# Patient Record
Sex: MALE
[De-identification: names, ages, dates, MRNs, and addresses within clinical notes are randomized per-mention and may not be internally consistent; named-entity substitution may affect disease eponyms.]

---

## 2017-04-10 ENCOUNTER — RX RENEWAL (OUTPATIENT)
Age: 57
End: 2017-04-10

## 2017-04-10 RX ORDER — POTASSIUM CHLORIDE 1500 MG/1
20 TABLET, FILM COATED, EXTENDED RELEASE ORAL DAILY
Qty: 4 | Refills: 0 | Status: ACTIVE | COMMUNITY
Start: 2017-04-10 | End: 1900-01-01

## 2017-05-01 ENCOUNTER — APPOINTMENT (OUTPATIENT)
Dept: SURGICAL ONCOLOGY | Facility: CLINIC | Age: 57
End: 2017-05-01

## 2017-12-17 ENCOUNTER — TRANSCRIPTION ENCOUNTER (OUTPATIENT)
Age: 57
End: 2017-12-17

## 2018-06-26 ENCOUNTER — RX RENEWAL (OUTPATIENT)
Age: 58
End: 2018-06-26

## 2018-06-26 RX ORDER — POLYETHYLENE GLYCOL 3350, SODIUM SULFATE, SODIUM CHLORIDE, POTASSIUM CHLORIDE, ASCORBIC ACID, SODIUM ASCORBATE 7.5-2.691G
100 KIT ORAL
Qty: 1 | Refills: 0 | Status: ACTIVE | COMMUNITY
Start: 2018-06-26 | End: 1900-01-01

## 2018-06-26 RX ORDER — POTASSIUM CHLORIDE 1500 MG/1
20 TABLET, EXTENDED RELEASE ORAL AS DIRECTED
Qty: 4 | Refills: 0 | Status: ACTIVE | COMMUNITY
Start: 2018-06-26 | End: 1900-01-01

## 2018-07-16 ENCOUNTER — OUTPATIENT (OUTPATIENT)
Dept: OUTPATIENT SERVICES | Facility: HOSPITAL | Age: 58
LOS: 1 days | End: 2018-07-16
Payer: COMMERCIAL

## 2018-07-16 ENCOUNTER — APPOINTMENT (OUTPATIENT)
Dept: SURGICAL ONCOLOGY | Facility: HOSPITAL | Age: 58
End: 2018-07-16

## 2018-07-16 ENCOUNTER — RESULT REVIEW (OUTPATIENT)
Age: 58
End: 2018-07-16

## 2018-07-16 DIAGNOSIS — Z12.11 ENCOUNTER FOR SCREENING FOR MALIGNANT NEOPLASM OF COLON: ICD-10-CM

## 2018-07-16 PROCEDURE — 45384 COLONOSCOPY W/LESION REMOVAL: CPT

## 2018-07-16 PROCEDURE — 88305 TISSUE EXAM BY PATHOLOGIST: CPT | Mod: 26

## 2018-07-16 PROCEDURE — 88305 TISSUE EXAM BY PATHOLOGIST: CPT

## 2018-07-18 LAB — SURGICAL PATHOLOGY STUDY: SIGNIFICANT CHANGE UP

## 2018-10-05 ENCOUNTER — EMERGENCY (EMERGENCY)
Facility: HOSPITAL | Age: 58
LOS: 1 days | Discharge: LEFT BEFORE DISCHARGE | End: 2018-10-05
Attending: EMERGENCY MEDICINE
Payer: COMMERCIAL

## 2018-10-05 VITALS
TEMPERATURE: 98 F | SYSTOLIC BLOOD PRESSURE: 117 MMHG | DIASTOLIC BLOOD PRESSURE: 75 MMHG | HEART RATE: 74 BPM | HEIGHT: 73 IN | RESPIRATION RATE: 16 BRPM | WEIGHT: 182.98 LBS | OXYGEN SATURATION: 99 %

## 2018-10-05 PROCEDURE — 71045 X-RAY EXAM CHEST 1 VIEW: CPT

## 2018-10-05 PROCEDURE — 71045 X-RAY EXAM CHEST 1 VIEW: CPT | Mod: 26

## 2018-10-05 PROCEDURE — 93005 ELECTROCARDIOGRAM TRACING: CPT

## 2018-10-05 PROCEDURE — 99283 EMERGENCY DEPT VISIT LOW MDM: CPT | Mod: 25

## 2018-10-05 PROCEDURE — 99283 EMERGENCY DEPT VISIT LOW MDM: CPT

## 2018-10-05 NOTE — ED PROVIDER NOTE - MEDICAL DECISION MAKING DETAILS
56yo M h/o cad cabg HLD, s/p angio/stenting today at [facility] p/w L sided chest pain. denies f/c/n/v/d/diaphoresis. pt reports pain is nonexertional, described as poking sensation to L side. pt reports abnormal stress testing w/ cardio, had angio today, stented, reports pain same site during procedure. received brillinta loading dose prior to discharge.

## 2018-10-05 NOTE — ED ADULT NURSE NOTE - CHIEF COMPLAINT QUOTE
Pt states" I had a cardiac stent placed this am, now I'm feeling like something is poking me in the chest" Pt states" I had a cardiac stent placed this am at [facility],, now I'm feeling like something is poking me in the chest"

## 2018-10-05 NOTE — ED PROVIDER NOTE - PROGRESS NOTE DETAILS
ekg ns. nonischemic, labs pending, patient leaving against medical advice.   The patient has decided to leave against medical advice (AMA).  I have made reasonable attempts to explain to the patient that leaving prior to completion of work up and treatment may result in recurrent or worsening of symptoms, severe permanent disability, pain and suffering, harm, injury, and/or death.  I have explained the risks, benefits, and alternatives to treatment as well as the attendant risks of refusing treatment at this time.  The patient has demonstrated comprehension and verbalizes understanding of these risks.  The patient has been told that they must return to the ER immediately for persistent or recurring symptoms, worsening symptoms, or any concerning symptoms.  The patient has also been informed that they may return to the ER immediately at any time if they change their mind and wish to resume care. The patient has been given the opportunity to ask questions and have them fully answered.

## 2018-10-05 NOTE — ED PROVIDER NOTE - NS ED ROS FT
GEN: no fever, no chills, no weakness  HENT: no eye pain, no visual changes, no ear pain, no visual or hearing changes, no sore throat, no swelling or neck pain  CV: +chest pain, no palpitations, no dizziness, no swelling  RESP: no coughing, no sob, no IWOB, no ARAUJO  GI: no abd pain, no distension, no nausea, no vomiting, no diarrhea, no constipation  : no dysuria,  no frequency, no hematuria, no discharge, no flank pain  MUSCULOSKELETAL: no myalgia, no arthralgia, no joint swelling, no bruising   SKIN: no rash, no wounds, no itching  NEURO: no change in mentation, no visual changes, no HA, no focal weakness, no trouble speaking, no gait abnormalities, no dizziness  PSYCH: no suidical ideation, no homicidal ideation, no depression, no anxiety, no hallucinations

## 2018-10-05 NOTE — ED ADULT NURSE NOTE - OBJECTIVE STATEMENT
Pt developed Pt developed mild chest pain to chest, generalized and abdomen. s/p cardiac stent placement. Pt refused care and stated "I had enough for one day,  I didn't follow post care instructions and now I have chest pain". Pt advised to atleast have blood work done. Pt refused. MD made aware and also spoke with patient. Pt signed AMA form.

## 2018-10-05 NOTE — ED ADULT TRIAGE NOTE - CHIEF COMPLAINT QUOTE
Pt states" I had a cardiac stent placed this am, now I'm feeling like something is poking me in the chest"

## 2018-10-05 NOTE — ED PROVIDER NOTE - PHYSICAL EXAMINATION
GEN: awake, alert, well appearing, NAD   HENT: atraumatic, normocephalic, BRADEN, EOMI, no midline instability, oropharynx w/o erythema or exudates, no lymphadenopathy  CV: normal rate and rhythm, S1, S2, no MRG, equal pulses throughout, no JVD  RESP: no distress, no IWOB, no retraction, clear to auscultation bilaterally   ABD: soft, nontender, nondistended, no rebound, no guarding, normoactive bowel sounds, no organomegally  MUSCULOSKELETAL: strenght 5/5 x 4, full range of motion, CMS intact   SKIN: normal color, no turgor, no wounds or rash   NEURO: Awake alert oriented x 3, no facial asymmetry, no slurred speech, no pronator drift, moving all extremities  PSYCH: no suicial ideation, no homicidal ideation, no depression, no anxiety, no hallucination

## 2018-10-05 NOTE — ED PROVIDER NOTE - CARE PLAN
Principal Discharge DX:	Chest pain, unspecified type  Secondary Diagnosis:	Left against medical advice

## 2020-01-25 ENCOUNTER — EMERGENCY (EMERGENCY)
Facility: HOSPITAL | Age: 60
LOS: 1 days | Discharge: HOME | End: 2020-01-25
Attending: EMERGENCY MEDICINE | Admitting: EMERGENCY MEDICINE
Payer: COMMERCIAL

## 2020-01-25 VITALS
TEMPERATURE: 98 F | RESPIRATION RATE: 16 BRPM | WEIGHT: 179.9 LBS | DIASTOLIC BLOOD PRESSURE: 72 MMHG | HEART RATE: 55 BPM | SYSTOLIC BLOOD PRESSURE: 151 MMHG | OXYGEN SATURATION: 98 % | HEIGHT: 73 IN

## 2020-01-25 VITALS
HEART RATE: 52 BPM | DIASTOLIC BLOOD PRESSURE: 75 MMHG | RESPIRATION RATE: 16 BRPM | OXYGEN SATURATION: 98 % | TEMPERATURE: 98 F | SYSTOLIC BLOOD PRESSURE: 123 MMHG

## 2020-01-25 LAB
ALBUMIN SERPL ELPH-MCNC: 3.5 G/DL — SIGNIFICANT CHANGE UP (ref 3.3–5)
ALP SERPL-CCNC: 59 U/L — SIGNIFICANT CHANGE UP (ref 40–120)
ALT FLD-CCNC: 25 U/L — SIGNIFICANT CHANGE UP (ref 12–78)
ANION GAP SERPL CALC-SCNC: 7 MMOL/L — SIGNIFICANT CHANGE UP (ref 5–17)
AST SERPL-CCNC: 15 U/L — SIGNIFICANT CHANGE UP (ref 15–37)
BASOPHILS # BLD AUTO: 0.07 K/UL — SIGNIFICANT CHANGE UP (ref 0–0.2)
BASOPHILS NFR BLD AUTO: 0.7 % — SIGNIFICANT CHANGE UP (ref 0–2)
BILIRUB SERPL-MCNC: 0.3 MG/DL — SIGNIFICANT CHANGE UP (ref 0.2–1.2)
BUN SERPL-MCNC: 13 MG/DL — SIGNIFICANT CHANGE UP (ref 7–23)
CALCIUM SERPL-MCNC: 8.8 MG/DL — SIGNIFICANT CHANGE UP (ref 8.5–10.1)
CHLORIDE SERPL-SCNC: 107 MMOL/L — SIGNIFICANT CHANGE UP (ref 96–108)
CO2 SERPL-SCNC: 27 MMOL/L — SIGNIFICANT CHANGE UP (ref 22–31)
CREAT SERPL-MCNC: 0.69 MG/DL — SIGNIFICANT CHANGE UP (ref 0.5–1.3)
EOSINOPHIL # BLD AUTO: 0.1 K/UL — SIGNIFICANT CHANGE UP (ref 0–0.5)
EOSINOPHIL NFR BLD AUTO: 1 % — SIGNIFICANT CHANGE UP (ref 0–6)
GLUCOSE SERPL-MCNC: 102 MG/DL — HIGH (ref 70–99)
HCT VFR BLD CALC: 44.3 % — SIGNIFICANT CHANGE UP (ref 39–50)
HGB BLD-MCNC: 15 G/DL — SIGNIFICANT CHANGE UP (ref 13–17)
IMM GRANULOCYTES NFR BLD AUTO: 0.2 % — SIGNIFICANT CHANGE UP (ref 0–1.5)
LYMPHOCYTES # BLD AUTO: 1.38 K/UL — SIGNIFICANT CHANGE UP (ref 1–3.3)
LYMPHOCYTES # BLD AUTO: 14.3 % — SIGNIFICANT CHANGE UP (ref 13–44)
MCHC RBC-ENTMCNC: 30.7 PG — SIGNIFICANT CHANGE UP (ref 27–34)
MCHC RBC-ENTMCNC: 33.9 GM/DL — SIGNIFICANT CHANGE UP (ref 32–36)
MCV RBC AUTO: 90.6 FL — SIGNIFICANT CHANGE UP (ref 80–100)
MONOCYTES # BLD AUTO: 0.61 K/UL — SIGNIFICANT CHANGE UP (ref 0–0.9)
MONOCYTES NFR BLD AUTO: 6.3 % — SIGNIFICANT CHANGE UP (ref 2–14)
NEUTROPHILS # BLD AUTO: 7.46 K/UL — HIGH (ref 1.8–7.4)
NEUTROPHILS NFR BLD AUTO: 77.5 % — HIGH (ref 43–77)
NRBC # BLD: 0 /100 WBCS — SIGNIFICANT CHANGE UP (ref 0–0)
PLATELET # BLD AUTO: 220 K/UL — SIGNIFICANT CHANGE UP (ref 150–400)
POTASSIUM SERPL-MCNC: 3.9 MMOL/L — SIGNIFICANT CHANGE UP (ref 3.5–5.3)
POTASSIUM SERPL-SCNC: 3.9 MMOL/L — SIGNIFICANT CHANGE UP (ref 3.5–5.3)
PROT SERPL-MCNC: 7.2 G/DL — SIGNIFICANT CHANGE UP (ref 6–8.3)
RBC # BLD: 4.89 M/UL — SIGNIFICANT CHANGE UP (ref 4.2–5.8)
RBC # FLD: 12.1 % — SIGNIFICANT CHANGE UP (ref 10.3–14.5)
SODIUM SERPL-SCNC: 141 MMOL/L — SIGNIFICANT CHANGE UP (ref 135–145)
WBC # BLD: 9.64 K/UL — SIGNIFICANT CHANGE UP (ref 3.8–10.5)
WBC # FLD AUTO: 9.64 K/UL — SIGNIFICANT CHANGE UP (ref 3.8–10.5)

## 2020-01-25 PROCEDURE — 36415 COLL VENOUS BLD VENIPUNCTURE: CPT

## 2020-01-25 PROCEDURE — 85027 COMPLETE CBC AUTOMATED: CPT

## 2020-01-25 PROCEDURE — 96374 THER/PROPH/DIAG INJ IV PUSH: CPT

## 2020-01-25 PROCEDURE — 93010 ELECTROCARDIOGRAM REPORT: CPT

## 2020-01-25 PROCEDURE — 96361 HYDRATE IV INFUSION ADD-ON: CPT

## 2020-01-25 PROCEDURE — 70450 CT HEAD/BRAIN W/O DYE: CPT

## 2020-01-25 PROCEDURE — 99284 EMERGENCY DEPT VISIT MOD MDM: CPT

## 2020-01-25 PROCEDURE — 80053 COMPREHEN METABOLIC PANEL: CPT

## 2020-01-25 PROCEDURE — 70450 CT HEAD/BRAIN W/O DYE: CPT | Mod: 26

## 2020-01-25 PROCEDURE — 93005 ELECTROCARDIOGRAM TRACING: CPT

## 2020-01-25 PROCEDURE — 99284 EMERGENCY DEPT VISIT MOD MDM: CPT | Mod: 25

## 2020-01-25 RX ORDER — SODIUM CHLORIDE 9 MG/ML
1000 INJECTION INTRAMUSCULAR; INTRAVENOUS; SUBCUTANEOUS ONCE
Refills: 0 | Status: COMPLETED | OUTPATIENT
Start: 2020-01-25 | End: 2020-01-25

## 2020-01-25 RX ORDER — MECLIZINE HCL 12.5 MG
1 TABLET ORAL
Qty: 20 | Refills: 0
Start: 2020-01-25 | End: 2020-01-29

## 2020-01-25 RX ORDER — MECLIZINE HCL 12.5 MG
25 TABLET ORAL ONCE
Refills: 0 | Status: COMPLETED | OUTPATIENT
Start: 2020-01-25 | End: 2020-01-25

## 2020-01-25 RX ORDER — ONDANSETRON 8 MG/1
4 TABLET, FILM COATED ORAL ONCE
Refills: 0 | Status: COMPLETED | OUTPATIENT
Start: 2020-01-25 | End: 2020-01-25

## 2020-01-25 RX ADMIN — SODIUM CHLORIDE 1000 MILLILITER(S): 9 INJECTION INTRAMUSCULAR; INTRAVENOUS; SUBCUTANEOUS at 13:34

## 2020-01-25 RX ADMIN — Medication 25 MILLIGRAM(S): at 13:34

## 2020-01-25 RX ADMIN — SODIUM CHLORIDE 1000 MILLILITER(S): 9 INJECTION INTRAMUSCULAR; INTRAVENOUS; SUBCUTANEOUS at 14:30

## 2020-01-25 RX ADMIN — ONDANSETRON 4 MILLIGRAM(S): 8 TABLET, FILM COATED ORAL at 13:34

## 2020-01-25 NOTE — ED PROVIDER NOTE - NSFOLLOWUPINSTRUCTIONS_ED_ALL_ED_FT
1.  Take antivert as prescribed.    Dizziness  Dizziness is a common problem. It is a feeling of unsteadiness or light-headedness. You may feel like you are about to faint. Dizziness can lead to injury if you stumble or fall. Anyone can become dizzy, but dizziness is more common in older adults. This condition can be caused by a number of things, including medicines, dehydration, or illness.  Follow these instructions at home:  Eating and drinking     Drink enough fluid to keep your urine clear or pale yellow. This helps to keep you from becoming dehydrated. Try to drink more clear fluids, such as water.Do not drink alcohol.Limit your caffeine intake if told to do so by your health care provider. Check ingredients and nutrition facts to see if a food or beverage contains caffeine.Limit your salt (sodium) intake if told to do so by your health care provider. Check ingredients and nutrition facts to see if a food or beverage contains sodium.Activity     Avoid making quick movements.  Rise slowly from chairs and steady yourself until you feel okay.In the morning, first sit up on the side of the bed. When you feel okay, stand slowly while you hold onto something until you know that your balance is fine.If you need to  one place for a long time, move your legs often. Tighten and relax the muscles in your legs while you are standing.Do not drive or use heavy machinery if you feel dizzy.Avoid bending down if you feel dizzy. Place items in your home so that they are easy for you to reach without leaning over.Lifestyle     Do not use any products that contain nicotine or tobacco, such as cigarettes and e-cigarettes. If you need help quitting, ask your health care provider.Try to reduce your stress level by using methods such as yoga or meditation. Talk with your health care provider if you need help to manage your stress.General instructions     Watch your dizziness for any changes.Take over-the-counter and prescription medicines only as told by your health care provider. Talk with your health care provider if you think that your dizziness is caused by a medicine that you are taking.Tell a friend or a family member that you are feeling dizzy. If he or she notices any changes in your behavior, have this person call your health care provider.Keep all follow-up visits as told by your health care provider. This is important.Contact a health care provider if:  Your dizziness does not go away.Your dizziness or light-headedness gets worse.You feel nauseous.You have reduced hearing.You have new symptoms.You are unsteady on your feet or you feel like the room is spinning.Get help right away if:  You vomit or have diarrhea and are unable to eat or drink anything.You have problems talking, walking, swallowing, or using your arms, hands, or legs.You feel generally weak.You are not thinking clearly or you have trouble forming sentences. It may take a friend or family member to notice this.You have chest pain, abdominal pain, shortness of breath, or sweating.Your vision changes.You have any bleeding.You have a severe headache.You have neck pain or a stiff neck.You have a fever.These symptoms may represent a serious problem that is an emergency. Do not wait to see if the symptoms will go away. Get medical help right away. Call your local emergency services (911 in the [place] Do not drive yourself to the hospital.   Summary  Dizziness is a feeling of unsteadiness or light-headedness. This condition can be caused by a number of things, including medicines, dehydration, or illness.Anyone can become dizzy, but dizziness is more common in older adults.Drink enough fluid to keep your urine clear or pale yellow. Do not drink alcohol.Avoid making quick movements if you feel dizzy. Monitor your dizziness for any changes.This information is not intended to replace advice given to you by your health care provider. Make sure you discuss any questions you have with your health care provider.    Document Released: 06/13/2002 Document Revised: 01/20/2018 Document Reviewed: 01/20/2018  [employer] Interactive Patient Education © 2019 [employer]

## 2020-01-25 NOTE — ED ADULT NURSE NOTE - CHPI ED NUR SYMPTOMS NEG
no fever/no vomiting/no change in level of consciousness/no blurred vision/no confusion/no loss of consciousness

## 2020-01-25 NOTE — ED PROVIDER NOTE - CARE PROVIDER_API CALL
Katya Farah)  Neurology  [address] [place] [address]  Phone: [phone]  Fax: [phone]  Follow Up Time:     Neno Nicole)  Otolaryngology  [address] [place] [address]  Phone: [phone]  Fax: [phone]  Follow Up Time:

## 2020-01-25 NOTE — ED PROVIDER NOTE - OBJECTIVE STATEMENT
Mild dizzy upon waking up, got worse during breakfast 8:30, got nauseous, but no vominting. Similar episodes in the past but was mild and resovled spontaneously, last was years ago.  no other complaints.

## 2020-01-25 NOTE — ED PROVIDER NOTE - DISCHARGE DATE
patient was critically ill... Patient was critically ill with a high probability of imminent or life threatening deterioration. 25-Jan-2020

## 2020-01-25 NOTE — ED PROVIDER NOTE - PROGRESS NOTE DETAILS
All results were explained to patient and/or family and a copy of all available results given.  wife was present

## 2020-01-25 NOTE — ED PROVIDER NOTE - PATIENT PORTAL LINK FT
You can access the FollowMyHealth Patient Portal offered by [facility] by registering at the following website: [email or web address]. By joining [employer]’s FollowMyHealth portal, you will also be able to view your health information using other applications (apps) compatible with our system.

## 2020-12-27 ENCOUNTER — EMERGENCY (EMERGENCY)
Facility: HOSPITAL | Age: 60
LOS: 1 days | Discharge: LEFT BEFORE DISCHARGE | End: 2020-12-27
Attending: EMERGENCY MEDICINE | Admitting: EMERGENCY MEDICINE
Payer: COMMERCIAL

## 2020-12-27 VITALS
SYSTOLIC BLOOD PRESSURE: 158 MMHG | DIASTOLIC BLOOD PRESSURE: 88 MMHG | RESPIRATION RATE: 15 BRPM | WEIGHT: 179.9 LBS | OXYGEN SATURATION: 99 % | HEART RATE: 71 BPM | TEMPERATURE: 97 F | HEIGHT: 73 IN

## 2020-12-27 PROCEDURE — L9991: CPT

## 2020-12-27 PROCEDURE — 99281 EMR DPT VST MAYX REQ PHY/QHP: CPT

## 2020-12-27 RX ORDER — ASPIRIN/CALCIUM CARB/MAGNESIUM 324 MG
1 TABLET ORAL
Qty: 0 | Refills: 0 | DISCHARGE

## 2020-12-27 RX ORDER — ROSUVASTATIN CALCIUM 5 MG/1
1 TABLET ORAL
Qty: 0 | Refills: 0 | DISCHARGE

## 2021-01-28 ENCOUNTER — APPOINTMENT (OUTPATIENT)
Dept: ORTHOPEDIC SURGERY | Facility: CLINIC | Age: 61
End: 2021-01-28
Payer: COMMERCIAL

## 2021-01-28 VITALS — HEIGHT: 73 IN | BODY MASS INDEX: 23.86 KG/M2 | WEIGHT: 180 LBS

## 2021-01-28 DIAGNOSIS — M75.42 IMPINGEMENT SYNDROME OF LEFT SHOULDER: ICD-10-CM

## 2021-01-28 DIAGNOSIS — M75.52 BURSITIS OF LEFT SHOULDER: ICD-10-CM

## 2021-01-28 PROCEDURE — 73030 X-RAY EXAM OF SHOULDER: CPT | Mod: LT

## 2021-01-28 PROCEDURE — 99072 ADDL SUPL MATRL&STAF TM PHE: CPT

## 2021-01-28 PROCEDURE — 99203 OFFICE O/P NEW LOW 30 MIN: CPT

## 2021-01-28 NOTE — PHYSICAL EXAM
[de-identified] : Right Upper Extremity\par o Shoulder :\par ¦ Inspection/Palpation : no tenderness over the greater tuberosity, no acromioclavicular joint tenderness, no tenderness anterior and posterior glenohumeral joint,no swelling, no deformities\par ¦ Range of Motion : ACTIVE FORWARD ELEVATION: Measured at 140 degrees, ACTIVE EXTERNAL ROTATION: Measured at 60 degrees, ACTIVE INTERNAL ROTATION: Measured at T7 \par ¦ Strength : external rotation 5/5, internal rotation 5/5, supraspinatus 5/5\par ¦ Stability : no joint instability on provocative testing\par ¦ Tests/Signs : Neer (-), Alcantara (-)\par o Upper Arm : no tenderness, no swelling, no deformities\par o Muscle Bulk : no atrophy\par o Sensation : sensation intact to light touch\par o Skin : no skin rash or discoloration\par o Vascular Exam : no edema, no cyanosis, radial and ulnar pulses normal\par \par Left Upper Extremity\par o Shoulder :\par ¦ Inspection/Palpation :  tenderness over the greater tuberosity, no acromioclavicular joint tenderness, no tenderness anterior and posterior glenohumeral joint,no swelling, no deformities\par ¦ Range of Motion : ACTIVE FORWARD ELEVATION: Measured at 135 degrees with pain, ACTIVE EXTERNAL ROTATION: Measured at 60 degrees with pain, ACTIVE INTERNAL ROTATION: Measured at T9 with pain. \par ¦ Strength : external rotation 5/5, internal rotation 5/5, supraspinatus 5/5\par ¦ Stability : no joint instability on provocative testing\par ¦ Tests/Signs : Neer (+), Alcantara (+) Speed’s Test (-) \par o Upper Arm : no tenderness, no swelling, no deformities\par o Muscle Bulk : no atrophy\par o Sensation : sensation intact to light touch\par o Skin : no skin rash or discoloration\par o Vascular Exam : no edema, no cyanosis, radial and ulnar pulses normal\par  [de-identified] : o Left Shoulder : Grashey, Axillary and Outlet views were obtained, there are no soft tissue abnormalities, no fractures, alignment is normal, normal appearing joint spaces, normal bone density, no bony lesions.\par \par

## 2021-01-28 NOTE — DISCUSSION/SUMMARY
[de-identified] : The underlying pathophysiology was reviewed in great detail with the patient as well as the various treatment options, including ice, analgesics, NSAIDs, Physical therapy, steroid injections.\par \par A home exercise sheet was given and discussed with the patient to follow.A Thera-Band was provided for exercise program. \par \par A prescription for Physical Therapy was provided.\par \par Activity modifications and restrictions were discussed. I advised avoiding overhead lifting. I advised the patient to work on good posture. \par \par FU 6 weeks. \par \par All questions were answered, all alternatives discussed and the patient is in complete agreement with that plan. Follow-up appointment as instructed. Any issues and the patient will call or come in sooner.

## 2023-02-21 ENCOUNTER — NON-APPOINTMENT (OUTPATIENT)
Age: 63
End: 2023-02-21

## 2023-03-01 PROBLEM — C44.519 BASAL CELL CARCINOMA (BCC) OF BACK: Status: ACTIVE | Noted: 2023-03-01

## 2023-03-02 ENCOUNTER — NON-APPOINTMENT (OUTPATIENT)
Age: 63
End: 2023-03-02

## 2023-03-02 ENCOUNTER — APPOINTMENT (OUTPATIENT)
Dept: SURGICAL ONCOLOGY | Facility: CLINIC | Age: 63
End: 2023-03-02
Payer: COMMERCIAL

## 2023-03-02 VITALS
TEMPERATURE: 97.7 F | HEART RATE: 71 BPM | BODY MASS INDEX: 24.52 KG/M2 | HEIGHT: 72 IN | WEIGHT: 181 LBS | RESPIRATION RATE: 17 BRPM | SYSTOLIC BLOOD PRESSURE: 139 MMHG | OXYGEN SATURATION: 98 % | DIASTOLIC BLOOD PRESSURE: 82 MMHG

## 2023-03-02 DIAGNOSIS — C44.519 BASAL CELL CARCINOMA OF SKIN OF OTHER PART OF TRUNK: ICD-10-CM

## 2023-03-02 PROCEDURE — 99204 OFFICE O/P NEW MOD 45 MIN: CPT

## 2023-03-03 NOTE — REVIEW OF SYSTEMS
[Negative] : Heme/Lymph [FreeTextEntry5] : CAD [de-identified] : Skin cancer [de-identified] : Ocular migraines

## 2023-03-03 NOTE — REASON FOR VISIT
[Initial Consultation] : an initial consultation for [Other: _____] : [unfilled] [FreeTextEntry2] : Basal cell carcinoma of the lower mid back

## 2023-03-03 NOTE — ASSESSMENT
[FreeTextEntry1] : 62-year-old man.\par \par History of CAD and CABG.\par \par Newly diagnosed basal cell carcinoma of the lower mid back.\par \par We reviewed his diagnosis, and the therapeutic options, of which one is surgical excision, as an outpatient surgical procedure, by me, coordinated with plastics.\par This is his preference over other  modalities such as electrical dissection and curettage, a Mohs procedure, or resection by plastics\par \par Oncologic concerns, operative approach, risk, benefits, alternatives, possible surgical outcomes reviewed in detail, all questions answered.\par \par \par Paperwork for surgical scheduling submitted.\par I contacted Dr. Tera Blackmon to let him know the plan.\par \par \par 3/3/2023:\par Patient called to inform us that he will be having his basal cell cancer removed as an office procedure by plastic surgery.\par No further details were provided

## 2023-03-03 NOTE — PHYSICAL EXAM
[Normal] : supple, no neck mass and thyroid not enlarged [Normal Neck Lymph Nodes] : normal neck lymph nodes  [Normal Supraclavicular Lymph Nodes] : normal supraclavicular lymph nodes [Normal Axillary Lymph Nodes] : normal axillary lymph nodes [Normal] : full range of motion and no deformities appreciated [de-identified] : Groins not examined [de-identified] : Below

## 2023-03-03 NOTE — HISTORY OF PRESENT ILLNESS
[de-identified] : 62-year-old man referred by a mutual friend, Dr. Tera LONGORIA with a basal cell carcinoma (BCCA) of his LOWER MID-BACK.\par \par This was an asymptomatic cutaneous lesion of unclear duration identified on baseline dermatologic survey with Dr. Ragsdale.\par The previous skin exam was with a different dermatologist, ~2020.\par \par No previous personal history of skin cancer.\par \par No prior personal history of malignancy.\par \par \par No relatives with skin cancer.\par \par + Family history of malignancy:\par Mother: Breast cancer.\par Father: Colorectal cancer.\par Our patient's colonoscopy is recorded below.\par \par \par Derm: Dr. Simon RAGSDALE\par \par \par No PCP presently.\par \par Cardiology: Dr. Servando MORA.\par \par No pacemaker or defibrillator.\par No anticoagulants.\par \par 81 mg aspirin daily.\par \par History of CAD.\par + Previous CABG (~2009)\par \par + Hypercholesterolemia.\par He takes Crestor and Zetia.\par \par \par + History of ocular migraines.\par His ophthalmologist is Dr. Ari ROBLES.\par Fall 2022 visit was unremarkable.\par \par \par September 2021:\par Colonoscopy with Dr. Cat TORRES at [facility]: Okay x3 years.

## 2023-03-22 ENCOUNTER — OUTPATIENT (OUTPATIENT)
Dept: OUTPATIENT SERVICES | Facility: HOSPITAL | Age: 63
LOS: 1 days | End: 2023-03-22
Payer: COMMERCIAL

## 2023-03-22 DIAGNOSIS — C80.1 MALIGNANT (PRIMARY) NEOPLASM, UNSPECIFIED: ICD-10-CM

## 2023-03-23 ENCOUNTER — RESULT REVIEW (OUTPATIENT)
Age: 63
End: 2023-03-23

## 2023-03-23 PROCEDURE — 88321 CONSLTJ&REPRT SLD PREP ELSWR: CPT

## 2023-03-28 LAB — SURGICAL PATHOLOGY STUDY: SIGNIFICANT CHANGE UP

## 2023-07-20 ENCOUNTER — APPOINTMENT (OUTPATIENT)
Dept: ORTHOPEDIC SURGERY | Facility: CLINIC | Age: 63
End: 2023-07-20
Payer: COMMERCIAL

## 2023-07-20 VITALS — BODY MASS INDEX: 24.52 KG/M2 | WEIGHT: 181 LBS | HEIGHT: 72 IN

## 2023-07-20 PROCEDURE — 99204 OFFICE O/P NEW MOD 45 MIN: CPT

## 2023-07-20 PROCEDURE — 73030 X-RAY EXAM OF SHOULDER: CPT | Mod: LT

## 2023-07-20 PROCEDURE — 73010 X-RAY EXAM OF SHOULDER BLADE: CPT | Mod: LT

## 2023-07-20 RX ORDER — NAPROXEN 500 MG/1
500 TABLET ORAL TWICE DAILY
Qty: 30 | Refills: 1 | Status: ACTIVE | COMMUNITY
Start: 2023-07-20 | End: 1900-01-01

## 2023-07-20 NOTE — HISTORY OF PRESENT ILLNESS
[Gradual] : gradual [7] : 7 [6] : 6 [Dull/Aching] : dull/aching [Sharp] : sharp [Leisure] : leisure [Ice] : ice [de-identified] : 07/20/2023 Mr. HAILEY WARREN, a 62 year old male, presents today for left shoulder pain, intermittent over the past few years. Recent worsening. Has tried home exercises and activity modifications. Worse with reaching outward and twisting motions. Pain has become bothersome at night. Denies n/t. Denies neck pain, but has noticed correlation to neck position and shoulder pain. 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 No prior injury. Has had this pain for a couple years. ROM normal. States pain radiates up to the neck.  [FreeTextEntry7] : Neck

## 2023-07-20 NOTE — DISCUSSION/SUMMARY
[de-identified] : 62m with left shoulder biceps tendinitis\par 1) Naproxen rx - gi precautions reviewed \par 2) start physical therapy\par 3) cryotherapy, rest and activity modification\par 4) rtc 4 weeks for re-eval \par \par Entered by Joan Palomares acting as scribe.\par Dr. Venegas-\par The documentation recorded by the scribe accurately reflects the service I personally performed and the decisions made by me.

## 2023-07-20 NOTE — PHYSICAL EXAM
[4___] : left grasp 4[unfilled]/5 [5___] : right grasp 5[unfilled]/5 [Left] : left shoulder [NL (0-180)] : full active forward flexion 0-180 degrees [NL (0-70)] : full internal rotation 0-70 degrees [] : motor and sensory intact distally [de-identified] : -olivia [de-identified] : active abduction 160 degrees [de-identified] : external rotation at 90 degrees of abduction 90 degrees [TWNoteComboBox5] : internal rotation at 90 degrees of abduction 70 degrees

## 2023-08-17 ENCOUNTER — APPOINTMENT (OUTPATIENT)
Dept: ORTHOPEDIC SURGERY | Facility: CLINIC | Age: 63
End: 2023-08-17
Payer: COMMERCIAL

## 2023-08-17 VITALS — BODY MASS INDEX: 24.52 KG/M2 | WEIGHT: 181 LBS | HEIGHT: 72 IN

## 2023-08-17 DIAGNOSIS — M75.22 BICIPITAL TENDINITIS, LEFT SHOULDER: ICD-10-CM

## 2023-08-17 PROCEDURE — 99213 OFFICE O/P EST LOW 20 MIN: CPT

## 2023-08-17 NOTE — PHYSICAL EXAM
[Left] : left shoulder [NL (0-180)] : full active forward flexion 0-180 degrees [NL (0-70)] : full internal rotation 0-70 degrees [] : negative [place] [de-identified] : -olivia [de-identified] : active abduction 175 degrees [de-identified] : external rotation at 90 degrees of abduction 90 degrees [TWNoteComboBox5] : internal rotation at 90 degrees of abduction 70 degrees

## 2023-08-17 NOTE — HISTORY OF PRESENT ILLNESS
[6] : 6 [1] : 2 [de-identified] : 8/17/23: Here for follow up left shoulder. Attending PT 2x/wk. States that he feels improvement in pain, has turned into an "ache".  Has not been taking naproxen.  07/20/2023 Mr. HAILEY WARREN, a 62 year old male, presents today for left shoulder pain, intermittent over the past few years. Recent worsening. Has tried home exercises and activity modifications. Worse with reaching outward and twisting motions. Pain has become bothersome at night. Denies n/t. Denies neck pain, but has noticed correlation to neck position and shoulder pain.  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 Pt pain slightly Improved.  Pt compliant with Physical Therapy twice a week feels like it' strengthening the Lt Shoulder. ROM Improving.  [de-identified] : Physical Therapy

## 2023-08-17 NOTE — DISCUSSION/SUMMARY
[de-identified] : 62m with left shoulder biceps tendinitis 1) c/w Naproxen rx - gi precautions reviewed  2) c/w physical therapy 3) cryotherapy, rest and activity modification 4) rtc 6 weeks for re-eval, if no improvement consider MRI

## 2023-09-18 NOTE — ED PROVIDER NOTE - SKIN, MLM
Universal Safety Interventions Skin normal color for race, warm, dry and intact. No evidence of rash.

## 2023-10-12 ENCOUNTER — APPOINTMENT (OUTPATIENT)
Dept: ORTHOPEDIC SURGERY | Facility: CLINIC | Age: 63
End: 2023-10-12

## 2024-08-17 ENCOUNTER — NON-APPOINTMENT (OUTPATIENT)
Age: 64
End: 2024-08-17

## 2024-08-19 ENCOUNTER — INPATIENT (INPATIENT)
Facility: HOSPITAL | Age: 64
LOS: 5 days | Discharge: HOME | DRG: 179 | End: 2024-08-25
Attending: HOSPITALIST | Admitting: INTERNAL MEDICINE
Payer: MEDICARE

## 2024-08-19 VITALS
HEART RATE: 68 BPM | OXYGEN SATURATION: 95 % | SYSTOLIC BLOOD PRESSURE: 121 MMHG | TEMPERATURE: 100 F | WEIGHT: 182.98 LBS | RESPIRATION RATE: 16 BRPM | DIASTOLIC BLOOD PRESSURE: 67 MMHG

## 2024-08-19 DIAGNOSIS — E78.5 HYPERLIPIDEMIA, UNSPECIFIED: ICD-10-CM

## 2024-08-19 DIAGNOSIS — U07.1 COVID-19: ICD-10-CM

## 2024-08-19 DIAGNOSIS — I25.10 ATHEROSCLEROTIC HEART DISEASE OF NATIVE CORONARY ARTERY WITHOUT ANGINA PECTORIS: ICD-10-CM

## 2024-08-19 DIAGNOSIS — Z29.9 ENCOUNTER FOR PROPHYLACTIC MEASURES, UNSPECIFIED: ICD-10-CM

## 2024-08-19 LAB
ALBUMIN SERPL ELPH-MCNC: 2.7 G/DL — LOW (ref 3.3–5)
ALBUMIN SERPL ELPH-MCNC: 3.4 G/DL — SIGNIFICANT CHANGE UP (ref 3.3–5)
ALP SERPL-CCNC: 45 U/L — SIGNIFICANT CHANGE UP (ref 40–120)
ALP SERPL-CCNC: 62 U/L — SIGNIFICANT CHANGE UP (ref 40–120)
ALT FLD-CCNC: 49 U/L — SIGNIFICANT CHANGE UP (ref 12–78)
ALT FLD-CCNC: 68 U/L — SIGNIFICANT CHANGE UP (ref 12–78)
ANION GAP SERPL CALC-SCNC: 9 MMOL/L — SIGNIFICANT CHANGE UP (ref 5–17)
AST SERPL-CCNC: 43 U/L — HIGH (ref 15–37)
AST SERPL-CCNC: 60 U/L — HIGH (ref 15–37)
BASOPHILS # BLD AUTO: 0.02 K/UL — SIGNIFICANT CHANGE UP (ref 0–0.2)
BASOPHILS NFR BLD AUTO: 0.3 % — SIGNIFICANT CHANGE UP (ref 0–2)
BILIRUB DIRECT SERPL-MCNC: 0.1 MG/DL — SIGNIFICANT CHANGE UP (ref 0–0.3)
BILIRUB INDIRECT FLD-MCNC: 0.4 MG/DL — SIGNIFICANT CHANGE UP (ref 0.2–1)
BILIRUB SERPL-MCNC: 0.5 MG/DL — SIGNIFICANT CHANGE UP (ref 0.2–1.2)
BILIRUB SERPL-MCNC: 0.5 MG/DL — SIGNIFICANT CHANGE UP (ref 0.2–1.2)
BUN SERPL-MCNC: 20 MG/DL — SIGNIFICANT CHANGE UP (ref 7–23)
CALCIUM SERPL-MCNC: 9.1 MG/DL — SIGNIFICANT CHANGE UP (ref 8.5–10.1)
CHLORIDE SERPL-SCNC: 98 MMOL/L — SIGNIFICANT CHANGE UP (ref 96–108)
CO2 SERPL-SCNC: 27 MMOL/L — SIGNIFICANT CHANGE UP (ref 22–31)
CREAT SERPL-MCNC: 0.91 MG/DL — SIGNIFICANT CHANGE UP (ref 0.5–1.3)
CREAT SERPL-MCNC: 1.3 MG/DL — SIGNIFICANT CHANGE UP (ref 0.5–1.3)
EGFR: 61 ML/MIN/1.73M2 — SIGNIFICANT CHANGE UP
EGFR: 94 ML/MIN/1.73M2 — SIGNIFICANT CHANGE UP
EOSINOPHIL # BLD AUTO: 0 K/UL — SIGNIFICANT CHANGE UP (ref 0–0.5)
EOSINOPHIL NFR BLD AUTO: 0 % — SIGNIFICANT CHANGE UP (ref 0–6)
GLUCOSE SERPL-MCNC: 109 MG/DL — HIGH (ref 70–99)
HCT VFR BLD CALC: 40 % — SIGNIFICANT CHANGE UP (ref 39–50)
HGB BLD-MCNC: 13.9 G/DL — SIGNIFICANT CHANGE UP (ref 13–17)
IMM GRANULOCYTES NFR BLD AUTO: 0.3 % — SIGNIFICANT CHANGE UP (ref 0–0.9)
INR BLD: 1.4 RATIO — HIGH (ref 0.85–1.18)
LYMPHOCYTES # BLD AUTO: 0.77 K/UL — LOW (ref 1–3.3)
LYMPHOCYTES # BLD AUTO: 10.1 % — LOW (ref 13–44)
MCHC RBC-ENTMCNC: 31.4 PG — SIGNIFICANT CHANGE UP (ref 27–34)
MCHC RBC-ENTMCNC: 34.8 GM/DL — SIGNIFICANT CHANGE UP (ref 32–36)
MCV RBC AUTO: 90.3 FL — SIGNIFICANT CHANGE UP (ref 80–100)
MONOCYTES # BLD AUTO: 0.86 K/UL — SIGNIFICANT CHANGE UP (ref 0–0.9)
MONOCYTES NFR BLD AUTO: 11.3 % — SIGNIFICANT CHANGE UP (ref 2–14)
NEUTROPHILS # BLD AUTO: 5.97 K/UL — SIGNIFICANT CHANGE UP (ref 1.8–7.4)
NEUTROPHILS NFR BLD AUTO: 78 % — HIGH (ref 43–77)
NRBC # BLD: 0 /100 WBCS — SIGNIFICANT CHANGE UP (ref 0–0)
PLATELET # BLD AUTO: 149 K/UL — LOW (ref 150–400)
POTASSIUM SERPL-MCNC: 3.6 MMOL/L — SIGNIFICANT CHANGE UP (ref 3.5–5.3)
POTASSIUM SERPL-SCNC: 3.6 MMOL/L — SIGNIFICANT CHANGE UP (ref 3.5–5.3)
PROT SERPL-MCNC: 6.2 G/DL — SIGNIFICANT CHANGE UP (ref 6–8.3)
PROT SERPL-MCNC: 8.1 G/DL — SIGNIFICANT CHANGE UP (ref 6–8.3)
PROTHROM AB SERPL-ACNC: 15.8 SEC — HIGH (ref 9.5–13)
RBC # BLD: 4.43 M/UL — SIGNIFICANT CHANGE UP (ref 4.2–5.8)
RBC # FLD: 12.3 % — SIGNIFICANT CHANGE UP (ref 10.3–14.5)
SARS-COV-2 RNA SPEC QL NAA+PROBE: DETECTED
SODIUM SERPL-SCNC: 134 MMOL/L — LOW (ref 135–145)
WBC # BLD: 7.64 K/UL — SIGNIFICANT CHANGE UP (ref 3.8–10.5)
WBC # FLD AUTO: 7.64 K/UL — SIGNIFICANT CHANGE UP (ref 3.8–10.5)

## 2024-08-19 PROCEDURE — 99222 1ST HOSP IP/OBS MODERATE 55: CPT | Mod: GC

## 2024-08-19 PROCEDURE — 71045 X-RAY EXAM CHEST 1 VIEW: CPT | Mod: 26

## 2024-08-19 PROCEDURE — 99285 EMERGENCY DEPT VISIT HI MDM: CPT

## 2024-08-19 RX ORDER — ACETAMINOPHEN 325 MG/1
650 TABLET ORAL ONCE
Refills: 0 | Status: COMPLETED | OUTPATIENT
Start: 2024-08-19 | End: 2024-08-19

## 2024-08-19 RX ORDER — SODIUM CHLORIDE 9 MG/ML
1000 INJECTION INTRAMUSCULAR; INTRAVENOUS; SUBCUTANEOUS ONCE
Refills: 0 | Status: COMPLETED | OUTPATIENT
Start: 2024-08-19 | End: 2024-08-19

## 2024-08-19 RX ORDER — REMDESIVIR 5 MG/ML
100 INJECTION INTRAVENOUS EVERY 24 HOURS
Refills: 0 | Status: COMPLETED | OUTPATIENT
Start: 2024-08-20 | End: 2024-08-23

## 2024-08-19 RX ORDER — ACETAMINOPHEN 325 MG/1
1000 TABLET ORAL ONCE
Refills: 0 | Status: COMPLETED | OUTPATIENT
Start: 2024-08-19 | End: 2024-08-19

## 2024-08-19 RX ORDER — BENZONATATE 100 MG
100 CAPSULE ORAL ONCE
Refills: 0 | Status: COMPLETED | OUTPATIENT
Start: 2024-08-19 | End: 2024-08-19

## 2024-08-19 RX ORDER — ENOXAPARIN SODIUM 100 MG/ML
40 INJECTION SUBCUTANEOUS EVERY 24 HOURS
Refills: 0 | Status: DISCONTINUED | OUTPATIENT
Start: 2024-08-19 | End: 2024-08-25

## 2024-08-19 RX ORDER — ACETAMINOPHEN 325 MG/1
650 TABLET ORAL EVERY 6 HOURS
Refills: 0 | Status: DISCONTINUED | OUTPATIENT
Start: 2024-08-19 | End: 2024-08-25

## 2024-08-19 RX ORDER — ASPIRIN 81 MG
81 TABLET, DELAYED RELEASE (ENTERIC COATED) ORAL DAILY
Refills: 0 | Status: DISCONTINUED | OUTPATIENT
Start: 2024-08-19 | End: 2024-08-25

## 2024-08-19 RX ORDER — DEXAMETHASONE 0.75 MG
6 TABLET ORAL DAILY
Refills: 0 | Status: DISCONTINUED | OUTPATIENT
Start: 2024-08-19 | End: 2024-08-25

## 2024-08-19 RX ORDER — REMDESIVIR 5 MG/ML
INJECTION INTRAVENOUS
Refills: 0 | Status: COMPLETED | OUTPATIENT
Start: 2024-08-19 | End: 2024-08-23

## 2024-08-19 RX ORDER — GUAIFENESIN 100 MG/5ML
600 LIQUID ORAL EVERY 12 HOURS
Refills: 0 | Status: DISCONTINUED | OUTPATIENT
Start: 2024-08-19 | End: 2024-08-25

## 2024-08-19 RX ORDER — REMDESIVIR 5 MG/ML
200 INJECTION INTRAVENOUS EVERY 24 HOURS
Refills: 0 | Status: COMPLETED | OUTPATIENT
Start: 2024-08-19 | End: 2024-08-19

## 2024-08-19 RX ORDER — ACETAMINOPHEN 325 MG/1
1000 TABLET ORAL ONCE
Refills: 0 | Status: DISCONTINUED | OUTPATIENT
Start: 2024-08-19 | End: 2024-08-19

## 2024-08-19 RX ADMIN — Medication 3 MILLIGRAM(S): at 22:05

## 2024-08-19 RX ADMIN — SODIUM CHLORIDE 1000 MILLILITER(S): 9 INJECTION INTRAMUSCULAR; INTRAVENOUS; SUBCUTANEOUS at 13:05

## 2024-08-19 RX ADMIN — ACETAMINOPHEN 650 MILLIGRAM(S): 325 TABLET ORAL at 13:05

## 2024-08-19 RX ADMIN — REMDESIVIR 200 MILLIGRAM(S): 5 INJECTION INTRAVENOUS at 18:41

## 2024-08-19 RX ADMIN — SODIUM CHLORIDE 1000 MILLILITER(S): 9 INJECTION INTRAMUSCULAR; INTRAVENOUS; SUBCUTANEOUS at 15:28

## 2024-08-19 RX ADMIN — ENOXAPARIN SODIUM 40 MILLIGRAM(S): 100 INJECTION SUBCUTANEOUS at 22:04

## 2024-08-19 RX ADMIN — Medication 100 MILLIGRAM(S): at 15:25

## 2024-08-19 RX ADMIN — GUAIFENESIN 600 MILLIGRAM(S): 100 LIQUID ORAL at 15:24

## 2024-08-19 RX ADMIN — ACETAMINOPHEN 400 MILLIGRAM(S): 325 TABLET ORAL at 22:04

## 2024-08-19 RX ADMIN — ACETAMINOPHEN 1000 MILLIGRAM(S): 325 TABLET ORAL at 23:04

## 2024-08-19 RX ADMIN — ACETAMINOPHEN 650 MILLIGRAM(S): 325 TABLET ORAL at 15:25

## 2024-08-19 NOTE — ED ADULT NURSE REASSESSMENT NOTE - NS ED NURSE REASSESS COMMENT FT1
received report from day shift pt stable   alert oriented x4 no distress pt on 2lnasal canula  pt admitted to med surg and hospitalist MD Herrera  made aware pt  to be transferred to floor

## 2024-08-19 NOTE — H&P ADULT - NSHPREVIEWOFSYSTEMS_GEN_ALL_CORE
CONSTITUTIONAL: denies fever, chills, fatigue, weakness  HEENT: denies blurred vision, sore throat  SKIN: denies new lesions, rash  CARDIOVASCULAR: denies chest pain, chest pressure, palpitations  RESPIRATORY: denies shortness of breath, cough, sputum production  GASTROINTESTINAL: denies nausea, vomiting, diarrhea, abdominal pain, melena or hematochezia  GENITOURINARY: denies dysuria, discharge  NEUROLOGICAL: denies numbness, headache, focal weakness  MUSCULOSKELETAL: denies new joint pain, muscle aches  HEMATOLOGIC: denies gross bleeding, bruising CONSTITUTIONAL: +fevers, +chills,+weakness  HEENT: denies blurred vision, sore throat  SKIN: denies new lesions, rash  CARDIOVASCULAR: denies chest pain, chest pressure, palpitations  RESPIRATORY: +SOB, +cough, +sputum production  GASTROINTESTINAL: denies nausea, vomiting, diarrhea, abdominal pain, melena or hematochezia  GENITOURINARY: denies dysuria, discharge  NEUROLOGICAL: denies numbness, headache, focal weakness  MUSCULOSKELETAL: denies new joint pain  HEMATOLOGIC: denies gross bleeding, bruising CONSTITUTIONAL: +fevers, +chills, +weakness  HEENT: denies blurred vision, sore throat  SKIN: denies new lesions, rash  CARDIOVASCULAR: denies chest pain, chest pressure, palpitations  RESPIRATORY: +SOB, +cough, +sputum production  GASTROINTESTINAL: denies nausea, vomiting, diarrhea, abdominal pain, melena or hematochezia  GENITOURINARY: denies dysuria, discharge  NEUROLOGICAL: denies numbness, headache, focal weakness  MUSCULOSKELETAL: denies new joint pain  HEMATOLOGIC: denies gross bleeding, bruising

## 2024-08-19 NOTE — H&P ADULT - NSHPPHYSICALEXAM_GEN_ALL_CORE
T(C): 37.8 (08-19-24 @ 15:16), Max: 38 (08-19-24 @ 12:48)  HR: 77 (08-19-24 @ 15:16) (68 - 77)  BP: 128/66 (08-19-24 @ 15:16) (121/67 - 128/66)  RR: 16 (08-19-24 @ 16:23) (16 - 20)  SpO2: 96% (08-19-24 @ 16:23) (87% - 96%)    GENERAL: patient appears well, no acute distress, appropriately interactive  EYES: sclera clear, no exudates  ENMT: oropharynx clear without erythema, no exudates, moist mucous membranes  NECK: supple, soft  LUNGS: good air entry bilaterally, clear to auscultation, symmetric breath sounds, no wheezing or rhonchi appreciated  HEART: soft S1/S2, regular rate and rhythm, no murmurs noted, no lower extremity edema  GASTROINTESTINAL: abdomen is soft, nontender, nondistended, normoactive bowel sounds  INTEGUMENT: good skin turgor, warm skin, appears well perfused  MUSCULOSKELETAL: no clubbing or cyanosis, no obvious deformity  NEUROLOGIC: awake, alert, oriented x3, good muscle tone in all 4 extremities  HEME/LYMPH: no obvious ecchymosis or petechiae T(C): 37.8 (08-19-24 @ 15:16), Max: 38 (08-19-24 @ 12:48)  HR: 77 (08-19-24 @ 15:16) (68 - 77)  BP: 128/66 (08-19-24 @ 15:16) (121/67 - 128/66)  RR: 16 (08-19-24 @ 16:23) (16 - 20)  SpO2: 96% (08-19-24 @ 16:23) (87% - 96%)    GENERAL: patient appears ill, appropriately interactive  EYES: sclera clear, no exudates  ENMT: +erythema of  oropharynx, +productive wet cough, yellow-green sputum  NECK: supple, soft, +erythema  LUNGS: +transmitted upper airway gurgling, clear to auscultation, symmetric breath sounds, no wheezing or rhonchi appreciated  HEART: soft S1/S2, regular rate and rhythm, no murmurs noted, no lower extremity edema  GASTROINTESTINAL: abdomen is soft, nontender, nondistended, normoactive bowel sounds  INTEGUMENT: good skin turgor, warm skin, appears well perfused  MUSCULOSKELETAL: no clubbing or cyanosis, no obvious deformity  NEUROLOGIC: awake, alert, oriented x3, good muscle tone in all 4 extremities  HEME/LYMPH: no obvious ecchymosis or petechiae

## 2024-08-19 NOTE — ED PROVIDER NOTE - CARE PLAN
Principal Discharge DX:	2019 novel coronavirus disease (COVID-19)   1 Principal Discharge DX:	2019 novel coronavirus disease (COVID-19)  Secondary Diagnosis:	Hypoxemia requiring supplemental oxygen

## 2024-08-19 NOTE — H&P ADULT - NSICDXFAMILYHX_GEN_ALL_CORE_FT
FAMILY HISTORY:  FH: type 1 diabetes    Father  Still living? Unknown  FH: type 2 diabetes, Age at diagnosis: Age Unknown

## 2024-08-19 NOTE — PATIENT PROFILE ADULT - FALL HARM RISK - HARM RISK INTERVENTIONS

## 2024-08-19 NOTE — H&P ADULT - ASSESSMENT
63 year old male with PMHx of CAD, HLD presents with cough, fever, chills, COVID+ 3 days ago started on paxlovid, admitted for COVID+ with associated hypoxia. 63 year old male with PMHx of CAD s/p CABG, HLD presents with cough, fever, chills, COVID+ 3 days ago started on paxlovid, admitted for COVID+ with associated hypoxia.

## 2024-08-19 NOTE — H&P ADULT - PROBLEM SELECTOR PLAN 2
- Pt with hx CAD s/p CABG  - Continue home ASA and statin - Pt with hx CAD s/p CABG  - Continue home ASA   - Hold home statin until acute issues have resolved

## 2024-08-19 NOTE — H&P ADULT - HISTORY OF PRESENT ILLNESS
63 year old male with PMhx of CAD, HLD presents with cough, chills, fever, COVID+ 3 days ago and started on paxlovid. Denies    ED Course:   Vitals: BP: 121/67, HR: 68, Temp: 100.4, RR: 16, SpO2: 95% on  RA  Labs:  wbc wnl, AST 60  CXR: no acute  EKG:   Received in the ED:  tylenol 650 mg, tessalon perle 100 mg, guaifenesin 600 mg, NS bolus 1L x 2   63 year old male with PMhx of CAD, HLD presents with cough, chills, fever, COVID+ 3 days ago and started on paxlovid. Denies    ED Course:   Vitals: BP: 121/67, HR: 68, Temp: 100.4, RR: 16, SpO2: 95% on  O2  Labs:  wbc wnl, AST 60  CXR: no acute  EKG:   Received in the ED:  tylenol 650 mg, tessalon perle 100 mg, guaifenesin 600 mg, NS bolus 1L x 2   63 year old male with PMhx of CAD, HLD presents with cough, chills, fever, COVID+ 3 days ago and started on paxlovid. Endorses productive cough, weakness, fevers, chills. Denies CP, abdominal pain, n/v/d.    ED Course:   Vitals: BP: 121/67, HR: 68, Temp: 100.4, RR: 16, SpO2: 95% on  O2 (desatted to 87 on RA)  Labs:  wbc wnl, AST 60  CXR: no acute  EKG:   Received in the ED:  tylenol 650 mg, tessalon perle 100 mg, guaifenesin 600 mg, NS bolus 1L x 2   63 year old male with PMhx of CAD s/p CABG, HLD presents with cough, chills, fever, COVID+ 3 days ago and started on paxlovid. Endorses productive cough, weakness, fevers, chills. Denies CP, abdominal pain, n/v/d.    ED Course:   Vitals: BP: 121/67, HR: 68, Temp: 100.4, RR: 16, SpO2: 95% on  O2 (desatted to 87 on RA)  Labs:  wbc wnl, AST 60  CXR: no acute  Received in the ED:  tylenol 650 mg, tessalon perle 100 mg, guaifenesin 600 mg, NS bolus 1L x 2

## 2024-08-19 NOTE — H&P ADULT - ATTENDING COMMENTS
pt was seen and examined at bedside.   This is a 63 year old male with PMHx of CAD s/p CABG, HLD presents with cough, fever, chills, COVID+ 3 days ago started on paxlovid, admitted for COVID+ with associated hypoxia.    PE  GENERAL: patient appears ill, appropriately interactive  EYES: sclera clear, no exudates  ENMT: +erythema of  oropharynx, +productive wet cough, yellow-green sputum  NECK: supple, soft, +erythema  LUNGS:, clear to auscultation, symmetric breath sounds, no wheezing or rhonchi appreciated  HEART: soft S1/S2, regular rate and rhythm, no murmurs noted, no lower extremity edema  Plan  1) COVID+   - Remdesivir x 5 days  - Promethazine DM  - Dexamethasone 6 mg x 10 days  ID consult  2) - Pt with hx CAD s/p CABG  - Continue home ASA   - Hold home statin until acute issues have resolved    Plan discussed with resident. Agree with above

## 2024-08-19 NOTE — ED PROVIDER NOTE - OBJECTIVE STATEMENT
62yo male bib ems with cough, chills fever diagnosed 3 days ago with covid and started on paxlovid, pt c/o fevers up and down no vomiting or diarrhea no other complaints

## 2024-08-19 NOTE — H&P ADULT - PROBLEM SELECTOR PLAN 1
- Pt COVID+ 3 days ago, started on paxlovid, presenting with fevers, chills, cough, hypoxia  - Remdesivir x 5 days  - Guaifenesin 600mg BID  - Dexamethasone 6 mg x 10 days  - Continuous pulse ox - Pt COVID+ 3 days ago, started on paxlovid, presenting with fevers, chills, cough, hypoxia  - Remdesivir x 5 days  - Guaifenesin 600mg BID  - Dexamethasone 6 mg x 10 days

## 2024-08-19 NOTE — H&P ADULT - PROBLEM SELECTOR PLAN 3
- Chronic  - Continue home crestor 20 mg 5 - Chronic  - Continue statin - Chronic  - Hold home statin and zetia until acute issues have resolved

## 2024-08-20 LAB
A1C WITH ESTIMATED AVERAGE GLUCOSE RESULT: 5.8 % — HIGH (ref 4–5.6)
ALBUMIN SERPL ELPH-MCNC: 2.7 G/DL — LOW (ref 3.3–5)
ALBUMIN SERPL ELPH-MCNC: 2.7 G/DL — LOW (ref 3.3–5)
ALP SERPL-CCNC: 50 U/L — SIGNIFICANT CHANGE UP (ref 40–120)
ALP SERPL-CCNC: 50 U/L — SIGNIFICANT CHANGE UP (ref 40–120)
ALT FLD-CCNC: 48 U/L — SIGNIFICANT CHANGE UP (ref 12–78)
ALT FLD-CCNC: 49 U/L — SIGNIFICANT CHANGE UP (ref 12–78)
ANION GAP SERPL CALC-SCNC: 6 MMOL/L — SIGNIFICANT CHANGE UP (ref 5–17)
AST SERPL-CCNC: 41 U/L — HIGH (ref 15–37)
AST SERPL-CCNC: 43 U/L — HIGH (ref 15–37)
BASOPHILS # BLD AUTO: 0.01 K/UL — SIGNIFICANT CHANGE UP (ref 0–0.2)
BASOPHILS NFR BLD AUTO: 0.2 % — SIGNIFICANT CHANGE UP (ref 0–2)
BILIRUB DIRECT SERPL-MCNC: 0.1 MG/DL — SIGNIFICANT CHANGE UP (ref 0–0.3)
BILIRUB INDIRECT FLD-MCNC: 0.4 MG/DL — SIGNIFICANT CHANGE UP (ref 0.2–1)
BILIRUB SERPL-MCNC: 0.4 MG/DL — SIGNIFICANT CHANGE UP (ref 0.2–1.2)
BILIRUB SERPL-MCNC: 0.5 MG/DL — SIGNIFICANT CHANGE UP (ref 0.2–1.2)
BUN SERPL-MCNC: 15 MG/DL — SIGNIFICANT CHANGE UP (ref 7–23)
CALCIUM SERPL-MCNC: 8.8 MG/DL — SIGNIFICANT CHANGE UP (ref 8.5–10.1)
CHLORIDE SERPL-SCNC: 105 MMOL/L — SIGNIFICANT CHANGE UP (ref 96–108)
CHOLEST SERPL-MCNC: 124 MG/DL — SIGNIFICANT CHANGE UP
CO2 SERPL-SCNC: 29 MMOL/L — SIGNIFICANT CHANGE UP (ref 22–31)
CREAT SERPL-MCNC: 0.81 MG/DL — SIGNIFICANT CHANGE UP (ref 0.5–1.3)
D DIMER BLD IA.RAPID-MCNC: 252 NG/ML DDU — HIGH
EGFR: 98 ML/MIN/1.73M2 — SIGNIFICANT CHANGE UP
EOSINOPHIL # BLD AUTO: 0 K/UL — SIGNIFICANT CHANGE UP (ref 0–0.5)
EOSINOPHIL NFR BLD AUTO: 0 % — SIGNIFICANT CHANGE UP (ref 0–6)
ESTIMATED AVERAGE GLUCOSE: 120 MG/DL — HIGH (ref 68–114)
GLUCOSE SERPL-MCNC: 89 MG/DL — SIGNIFICANT CHANGE UP (ref 70–99)
HCT VFR BLD CALC: 44.8 % — SIGNIFICANT CHANGE UP (ref 39–50)
HDLC SERPL-MCNC: 45 MG/DL — SIGNIFICANT CHANGE UP
HGB BLD-MCNC: 15.1 G/DL — SIGNIFICANT CHANGE UP (ref 13–17)
IMM GRANULOCYTES NFR BLD AUTO: 0.2 % — SIGNIFICANT CHANGE UP (ref 0–0.9)
INR BLD: 1.21 RATIO — HIGH (ref 0.85–1.18)
LIPID PNL WITH DIRECT LDL SERPL: 63 MG/DL — SIGNIFICANT CHANGE UP
LYMPHOCYTES # BLD AUTO: 1.28 K/UL — SIGNIFICANT CHANGE UP (ref 1–3.3)
LYMPHOCYTES # BLD AUTO: 25 % — SIGNIFICANT CHANGE UP (ref 13–44)
MCHC RBC-ENTMCNC: 31.1 PG — SIGNIFICANT CHANGE UP (ref 27–34)
MCHC RBC-ENTMCNC: 33.7 GM/DL — SIGNIFICANT CHANGE UP (ref 32–36)
MCV RBC AUTO: 92.4 FL — SIGNIFICANT CHANGE UP (ref 80–100)
MONOCYTES # BLD AUTO: 0.7 K/UL — SIGNIFICANT CHANGE UP (ref 0–0.9)
MONOCYTES NFR BLD AUTO: 13.6 % — SIGNIFICANT CHANGE UP (ref 2–14)
NEUTROPHILS # BLD AUTO: 3.13 K/UL — SIGNIFICANT CHANGE UP (ref 1.8–7.4)
NEUTROPHILS NFR BLD AUTO: 61 % — SIGNIFICANT CHANGE UP (ref 43–77)
NON HDL CHOLESTEROL: 79 MG/DL — SIGNIFICANT CHANGE UP
NRBC # BLD: 0 /100 WBCS — SIGNIFICANT CHANGE UP (ref 0–0)
PLATELET # BLD AUTO: 154 K/UL — SIGNIFICANT CHANGE UP (ref 150–400)
POTASSIUM SERPL-MCNC: 3.8 MMOL/L — SIGNIFICANT CHANGE UP (ref 3.5–5.3)
POTASSIUM SERPL-SCNC: 3.8 MMOL/L — SIGNIFICANT CHANGE UP (ref 3.5–5.3)
PROT SERPL-MCNC: 6.6 G/DL — SIGNIFICANT CHANGE UP (ref 6–8.3)
PROT SERPL-MCNC: 6.7 G/DL — SIGNIFICANT CHANGE UP (ref 6–8.3)
PROTHROM AB SERPL-ACNC: 13.7 SEC — HIGH (ref 9.5–13)
RBC # BLD: 4.85 M/UL — SIGNIFICANT CHANGE UP (ref 4.2–5.8)
RBC # FLD: 12.5 % — SIGNIFICANT CHANGE UP (ref 10.3–14.5)
SODIUM SERPL-SCNC: 140 MMOL/L — SIGNIFICANT CHANGE UP (ref 135–145)
TRIGL SERPL-MCNC: 79 MG/DL — SIGNIFICANT CHANGE UP
WBC # BLD: 5.13 K/UL — SIGNIFICANT CHANGE UP (ref 3.8–10.5)
WBC # FLD AUTO: 5.13 K/UL — SIGNIFICANT CHANGE UP (ref 3.8–10.5)

## 2024-08-20 PROCEDURE — 99223 1ST HOSP IP/OBS HIGH 75: CPT

## 2024-08-20 PROCEDURE — 71275 CT ANGIOGRAPHY CHEST: CPT | Mod: 26

## 2024-08-20 RX ORDER — BENZOCAINE/MENTHOL 20 %-0.5 %
1 AEROSOL (GRAM) TOPICAL THREE TIMES A DAY
Refills: 0 | Status: DISCONTINUED | OUTPATIENT
Start: 2024-08-20 | End: 2024-08-25

## 2024-08-20 RX ORDER — SENNA 187 MG
2 TABLET ORAL AT BEDTIME
Refills: 0 | Status: DISCONTINUED | OUTPATIENT
Start: 2024-08-20 | End: 2024-08-25

## 2024-08-20 RX ORDER — EZETIMIBE 10 MG/1
5 TABLET ORAL DAILY
Refills: 0 | Status: DISCONTINUED | OUTPATIENT
Start: 2024-08-20 | End: 2024-08-25

## 2024-08-20 RX ORDER — POLYETHYLENE GLYCOL 3350 17 G/17G
17 POWDER, FOR SOLUTION ORAL DAILY
Refills: 0 | Status: DISCONTINUED | OUTPATIENT
Start: 2024-08-20 | End: 2024-08-25

## 2024-08-20 RX ORDER — SODIUM CHLORIDE 9 MG/ML
1000 INJECTION INTRAMUSCULAR; INTRAVENOUS; SUBCUTANEOUS
Refills: 0 | Status: DISCONTINUED | OUTPATIENT
Start: 2024-08-20 | End: 2024-08-23

## 2024-08-20 RX ORDER — BENZOCAINE/MENTHOL 20 %-0.5 %
1 AEROSOL (GRAM) TOPICAL THREE TIMES A DAY
Refills: 0 | Status: DISCONTINUED | OUTPATIENT
Start: 2024-08-20 | End: 2024-08-20

## 2024-08-20 RX ORDER — ROSUVASTATIN CALCIUM 10 MG/1
20 TABLET ORAL AT BEDTIME
Refills: 0 | Status: DISCONTINUED | OUTPATIENT
Start: 2024-08-20 | End: 2024-08-25

## 2024-08-20 RX ORDER — BENZONATATE 100 MG
100 CAPSULE ORAL ONCE
Refills: 0 | Status: COMPLETED | OUTPATIENT
Start: 2024-08-20 | End: 2024-08-20

## 2024-08-20 RX ORDER — BENZONATATE 100 MG
100 CAPSULE ORAL THREE TIMES A DAY
Refills: 0 | Status: DISCONTINUED | OUTPATIENT
Start: 2024-08-20 | End: 2024-08-25

## 2024-08-20 RX ADMIN — Medication 6 MILLIGRAM(S): at 05:50

## 2024-08-20 RX ADMIN — Medication 81 MILLIGRAM(S): at 11:39

## 2024-08-20 RX ADMIN — Medication 100 MILLIGRAM(S): at 22:09

## 2024-08-20 RX ADMIN — GUAIFENESIN 600 MILLIGRAM(S): 100 LIQUID ORAL at 05:50

## 2024-08-20 RX ADMIN — Medication 100 MILLIGRAM(S): at 11:40

## 2024-08-20 RX ADMIN — ENOXAPARIN SODIUM 40 MILLIGRAM(S): 100 INJECTION SUBCUTANEOUS at 22:10

## 2024-08-20 RX ADMIN — Medication 3 MILLIGRAM(S): at 22:09

## 2024-08-20 RX ADMIN — Medication 1 LOZENGE: at 22:10

## 2024-08-20 RX ADMIN — Medication 100 MILLIGRAM(S): at 18:24

## 2024-08-20 RX ADMIN — EZETIMIBE 5 MILLIGRAM(S): 10 TABLET ORAL at 11:39

## 2024-08-20 RX ADMIN — SODIUM CHLORIDE 75 MILLILITER(S): 9 INJECTION INTRAMUSCULAR; INTRAVENOUS; SUBCUTANEOUS at 11:40

## 2024-08-20 RX ADMIN — REMDESIVIR 200 MILLIGRAM(S): 5 INJECTION INTRAVENOUS at 17:57

## 2024-08-20 RX ADMIN — GUAIFENESIN 600 MILLIGRAM(S): 100 LIQUID ORAL at 17:56

## 2024-08-20 RX ADMIN — ROSUVASTATIN CALCIUM 20 MILLIGRAM(S): 10 TABLET ORAL at 22:09

## 2024-08-20 NOTE — CARE COORDINATION ASSESSMENT. - OTHER PERTINENT DISCHARGE PLANNING INFORMATION:
CM met with the patient at the bedside, educated pt on role of CM and transition planning. Patient verbalized understanding. CM provided direct contact/resource folder and remains available. Pt resides in a house with his wife, has 4 steps to enter, 5 to upstairs and 5 to basement. Denies any DME or prior home care services. Per patient he is independent with ADL's, ambulating and negotiating stairs.

## 2024-08-20 NOTE — CARE COORDINATION ASSESSMENT. - NSPASTMEDSURGHISTORY_GEN_ALL_CORE_FT
PAST MEDICAL & SURGICAL HISTORY:  Hypercholesteremia      CAD (Coronary Artery Disease)      CABG (Coronary Artery Bypass Graft)      CAD (Coronary Artery Disease)

## 2024-08-20 NOTE — PHARMACOTHERAPY INTERVENTION NOTE - COMMENTS
Patient is a 63 year old male on rosuvastatin 20 mg at home. Discussed with Dr. Torrez and recommended continuing rosuvastatin 20 mg inpatient. MD agreed and medication ordered.

## 2024-08-20 NOTE — CARE COORDINATION ASSESSMENT. - NSCAREPROVIDERS_GEN_ALL_CORE_FT
CARE PROVIDERS:  Accepting Physician: Yoandy Monge  Administration: Raghav Forrest  Administration: Eugenio Petty  Admitting: Yoandy Monge  Attending: Juma Torrez  Consultant: Irving Walden  Consultant: Maged Ivy  Covering Team: Itzel Miranda  Covering Team: Norma Henderson ED Attending: Bonita Romo  ED Nurse: Triny Orozco  Nurse: Karla Tucker  Nurse: Karla Macias  Nurse: Petra Appiah  Ordered: ServiceValeri, SCMMLM  Override: Petra Appiah  Override: Anand Lockett  PCA/Nursing Assistant: Mable Coates  Primary Team: Sonya Herrera  Primary Team: Eileen Novak  Primary Team: Juma Torrez  Primary Team: Peg Fong  Registered Dietitian: Donna Boone  : Danae Carrizales  Team: [facility], Team

## 2024-08-20 NOTE — CONSULT NOTE ADULT - ASSESSMENT
63 year old male with PMhx of CAD s/p CABG, HLD presents with cough, chills, fever, COVID+ 3 days ago and started on paxlovid. Endorses productive cough, weakness, fevers, chills. Denies CP, abdominal pain, n/v/d.    cad  cabg   hld  covid  weakness  malaise    remdesivir -   decadron -   cough rx regimen  dvt p  isol precs  o2 support  goal sat > 88 pct  acap prn  sx management  [facility]vs rx regimen  D dimer noted, will check CTA chest - eval viral pna - pe - hypoxemia - superimposed LRTI -   above d/w Patient, pt is a DDS

## 2024-08-20 NOTE — PROGRESS NOTE ADULT - SUBJECTIVE AND OBJECTIVE BOX
Patient is a 63y old  Male who presents with a chief complaint of COVID+ with associated hypoxia (19 Aug 2024 16:25)      INTERVAL HPI/OVERNIGHT EVENTS: Patient seen and examined at bedside. No overnight events occurred. Patient has no complaints at this time. Denies fevers, chills, headache, lightheadedness, chest pain, dyspnea, abdominal pain, n/v/d/c.    MEDICATIONS  (STANDING):  aspirin  chewable 81 milliGRAM(s) Oral daily  dexAMETHasone     Tablet 6 milliGRAM(s) Oral daily  enoxaparin Injectable 40 milliGRAM(s) SubCutaneous every 24 hours  guaiFENesin  milliGRAM(s) Oral every 12 hours  remdesivir  IVPB   IV Intermittent   remdesivir  IVPB 100 milliGRAM(s) IV Intermittent every 24 hours    MEDICATIONS  (PRN):  acetaminophen     Tablet .. 650 milliGRAM(s) Oral every 6 hours PRN Temp greater or equal to 38C (100.4F), Mild Pain (1 - 3)  melatonin 3 milliGRAM(s) Oral at bedtime PRN Insomnia      Allergies    No Known Allergies    Intolerances        REVIEW OF SYSTEMS:  CONSTITUTIONAL: No fever or chills  HEENT:  No headache, no sore throat  RESPIRATORY: No cough, wheezing, or shortness of breath  CARDIOVASCULAR: No chest pain, palpitations  GASTROINTESTINAL: No abd pain, nausea, vomiting, or diarrhea  GENITOURINARY: No dysuria, frequency, or hematuria  NEUROLOGICAL: no focal weakness or dizziness  MUSCULOSKELETAL: no myalgias     Vital Signs Last 24 Hrs  T(C): 37.3 (20 Aug 2024 04:49), Max: 39 (19 Aug 2024 21:25)  T(F): 99.1 (20 Aug 2024 04:49), Max: 102.2 (19 Aug 2024 21:25)  HR: 60 (20 Aug 2024 04:49) (60 - 77)  BP: 115/73 (20 Aug 2024 04:49) (115/73 - 132/72)  BP(mean): --  RR: 20 (20 Aug 2024 04:49) (16 - 29)  SpO2: 96% (20 Aug 2024 04:49) (87% - 96%)    Parameters below as of 20 Aug 2024 04:49  Patient On (Oxygen Delivery Method): nasal cannula  O2 Flow (L/min): 2      PHYSICAL EXAM:  GENERAL: NAD  HEENT:  anicteric, moist mucous membranes  CHEST/LUNG:  CTA b/l, no rales, wheezes, or rhonchi  HEART:  RRR, S1, S2  ABDOMEN:  BS+, soft, nontender, nondistended  EXTREMITIES: no edema, cyanosis, or calf tenderness  NERVOUS SYSTEM: answers questions and follows commands appropriately    LABS:                        13.9   7.64  )-----------( 149      ( 19 Aug 2024 15:30 )             40.0     CBC Full  -  ( 19 Aug 2024 15:30 )  WBC Count : 7.64 K/uL  Hemoglobin : 13.9 g/dL  Hematocrit : 40.0 %  Platelet Count - Automated : 149 K/uL  Mean Cell Volume : 90.3 fl  Mean Cell Hemoglobin : 31.4 pg  Mean Cell Hemoglobin Concentration : 34.8 gm/dL  Auto Neutrophil # : 5.97 K/uL  Auto Lymphocyte # : 0.77 K/uL  Auto Monocyte # : 0.86 K/uL  Auto Eosinophil # : 0.00 K/uL  Auto Basophil # : 0.02 K/uL  Auto Neutrophil % : 78.0 %  Auto Lymphocyte % : 10.1 %  Auto Monocyte % : 11.3 %  Auto Eosinophil % : 0.0 %  Auto Basophil % : 0.3 %    19 Aug 2024 17:15    x      |  x      |  x      ----------------------------<  x      x       |  x      |  0.91     Ca    9.1        19 Aug 2024 13:20    TPro  6.2    /  Alb  2.7    /  TBili  0.5    /  DBili  0.1    /  AST  43     /  ALT  49     /  AlkPhos  45     19 Aug 2024 17:15    PT/INR - ( 19 Aug 2024 17:15 )   PT: 15.8 sec;   INR: 1.40 ratio           Urinalysis Basic - ( 19 Aug 2024 13:20 )    Color: x / Appearance: x / SG: x / pH: x  Gluc: 109 mg/dL / Ketone: x  / Bili: x / Urobili: x   Blood: x / Protein: x / Nitrite: x   Leuk Esterase: x / RBC: x / WBC x   Sq Epi: x / Non Sq Epi: x / Bacteria: x      CAPILLARY BLOOD GLUCOSE              RADIOLOGY & ADDITIONAL TESTS:    Personally reviewed.     Consultant(s) Notes Reviewed:  [x] YES  [ ] NO     Patient is a 63y old  Male who presents with a chief complaint of COVID+ with associated hypoxia (19 Aug 2024 16:25)      INTERVAL HPI/OVERNIGHT EVENTS: Patient seen and examined at bedside. No overnight events occurred. Patient complains of productive sputum that causes throat pain. Denies fevers, chills, headache, lightheadedness, chest pain, dyspnea, abdominal pain, n/v/d/c.    MEDICATIONS  (STANDING):  aspirin  chewable 81 milliGRAM(s) Oral daily  dexAMETHasone     Tablet 6 milliGRAM(s) Oral daily  enoxaparin Injectable 40 milliGRAM(s) SubCutaneous every 24 hours  guaiFENesin  milliGRAM(s) Oral every 12 hours  remdesivir  IVPB   IV Intermittent   remdesivir  IVPB 100 milliGRAM(s) IV Intermittent every 24 hours    MEDICATIONS  (PRN):  acetaminophen     Tablet .. 650 milliGRAM(s) Oral every 6 hours PRN Temp greater or equal to 38C (100.4F), Mild Pain (1 - 3)  melatonin 3 milliGRAM(s) Oral at bedtime PRN Insomnia      Allergies    No Known Allergies    Intolerances      REVIEW OF SYSTEMS:  CONSTITUTIONAL: No fever or chills  HEENT:  No headache, (+) sore throat  RESPIRATORY: (+) cough, no wheezing, or shortness of breath  CARDIOVASCULAR: No chest pain, palpitations  GASTROINTESTINAL: No abd pain, nausea, vomiting, or diarrhea  GENITOURINARY: No dysuria, frequency, or hematuria  NEUROLOGICAL: no focal weakness or dizziness  MUSCULOSKELETAL: no myalgias     Vital Signs Last 24 Hrs  T(C): 37.3 (20 Aug 2024 04:49), Max: 39 (19 Aug 2024 21:25)  T(F): 99.1 (20 Aug 2024 04:49), Max: 102.2 (19 Aug 2024 21:25)  HR: 60 (20 Aug 2024 04:49) (60 - 77)  BP: 115/73 (20 Aug 2024 04:49) (115/73 - 132/72)  BP(mean): --  RR: 20 (20 Aug 2024 04:49) (16 - 29)  SpO2: 96% (20 Aug 2024 04:49) (87% - 96%)    Parameters below as of 20 Aug 2024 04:49  Patient On (Oxygen Delivery Method): nasal cannula  O2 Flow (L/min): 2      PHYSICAL EXAM:  GENERAL: Ill appearing, resting in bed  HEENT:  anicteric, moist mucous membranes  CHEST/LUNG:  CTA b/l, no rales, wheezes, or rhonchi  HEART:  RRR, S1, S2  ABDOMEN:  BS+, soft, nontender, nondistended  EXTREMITIES: no edema, cyanosis, or calf tenderness  NERVOUS SYSTEM: answers questions and follows commands appropriately    LABS:                        13.9   7.64  )-----------( 149      ( 19 Aug 2024 15:30 )             40.0     CBC Full  -  ( 19 Aug 2024 15:30 )  WBC Count : 7.64 K/uL  Hemoglobin : 13.9 g/dL  Hematocrit : 40.0 %  Platelet Count - Automated : 149 K/uL  Mean Cell Volume : 90.3 fl  Mean Cell Hemoglobin : 31.4 pg  Mean Cell Hemoglobin Concentration : 34.8 gm/dL  Auto Neutrophil # : 5.97 K/uL  Auto Lymphocyte # : 0.77 K/uL  Auto Monocyte # : 0.86 K/uL  Auto Eosinophil # : 0.00 K/uL  Auto Basophil # : 0.02 K/uL  Auto Neutrophil % : 78.0 %  Auto Lymphocyte % : 10.1 %  Auto Monocyte % : 11.3 %  Auto Eosinophil % : 0.0 %  Auto Basophil % : 0.3 %    19 Aug 2024 17:15    x      |  x      |  x      ----------------------------<  x      x       |  x      |  0.91     Ca    9.1        19 Aug 2024 13:20    TPro  6.2    /  Alb  2.7    /  TBili  0.5    /  DBili  0.1    /  AST  43     /  ALT  49     /  AlkPhos  45     19 Aug 2024 17:15    PT/INR - ( 19 Aug 2024 17:15 )   PT: 15.8 sec;   INR: 1.40 ratio           Urinalysis Basic - ( 19 Aug 2024 13:20 )    Color: x / Appearance: x / SG: x / pH: x  Gluc: 109 mg/dL / Ketone: x  / Bili: x / Urobili: x   Blood: x / Protein: x / Nitrite: x   Leuk Esterase: x / RBC: x / WBC x   Sq Epi: x / Non Sq Epi: x / Bacteria: x      CAPILLARY BLOOD GLUCOSE      RADIOLOGY & ADDITIONAL TESTS:    Personally reviewed.     Consultant(s) Notes Reviewed:  [x] YES  [ ] NO

## 2024-08-20 NOTE — PROGRESS NOTE ADULT - PROBLEM SELECTOR PLAN 1
- Pt COVID+ 3 days ago, started on paxlovid, presenting with fevers, chills, cough, hypoxia  - Remdesivir x 5 days  - Guaifenesin 600mg BID  - Dexamethasone 6 mg x 10 days - Pt COVID+ 3 days ago, started on paxlovid, presenting with fevers, chills, cough, hypoxia  - Remdesivir x 5 days  - Guaifenesin 600mg BID  - Dexamethasone 6 mg x 10 days  - started tessalon perles

## 2024-08-20 NOTE — PROGRESS NOTE ADULT - PROBLEM SELECTOR PLAN 3
- Chronic  - Hold home statin and zetia until acute issues have resolved - Chronic  - Restarted home statin and zetia

## 2024-08-20 NOTE — CONSULT NOTE ADULT - SUBJECTIVE AND OBJECTIVE BOX
Date/Time Patient Seen:  		  Referring MD:   Data Reviewed	       Patient is a 63y old  Male who presents with a chief complaint of COVID+ with associated hypoxia (20 Aug 2024 07:46)      Subjective/HPI    History of Present Illness:  Reason for Admission: COVID+ with associated hypoxia  History of Present Illness:   63 year old male with PMhx of CAD s/p CABG, HLD presents with cough, chills, fever, COVID+ 3 days ago and started on paxlovid. Endorses productive cough, weakness, fevers, chills. Denies CP, abdominal pain, n/v/d.    ED Course:   Vitals: BP: 121/67, HR: 68, Temp: 100.4, RR: 16, SpO2: 95% on  O2 (desatted to 87 on RA)  Labs:  wbc wnl, AST 60  CXR: no acute  Received in the ED:  tylenol 650 mg, tessalon perle 100 mg, guaifenesin 600 mg, NS bolus 1L x 2  PAST MEDICAL & SURGICAL HISTORY:  CAD (Coronary Artery Disease)    Hypercholesteremia    CAD (Coronary Artery Disease)    CABG (Coronary Artery Bypass Graft)         Review of Systems:  Review of Systems: CONSTITUTIONAL: +fevers, +chills, +weakness  HEENT: denies blurred vision, sore throat  SKIN: denies new lesions, rash  CARDIOVASCULAR: denies chest pain, chest pressure, palpitations  RESPIRATORY: +SOB, +cough, +sputum production  GASTROINTESTINAL: denies nausea, vomiting, diarrhea, abdominal pain, melena or hematochezia  GENITOURINARY: denies dysuria, discharge  NEUROLOGICAL: denies numbness, headache, focal weakness  MUSCULOSKELETAL: denies new joint pain  HEMATOLOGIC: denies gross bleeding, bruising    Other Review of Systems: All other review of systems negative, except as noted in HPI      Allergies and Intolerances:        Allergies:  	No Known Allergies:     Home Medications:   * Patient Currently Takes Medications as of 19-Aug-2024 17:13 documented in Structured Notes  · 	aspirin 81 mg oral tablet: Last Dose Taken:  , 1 tab(s) orally once a day  · 	Zetia 10 mg oral tablet: Last Dose Taken:  , 0.5 tab(s) orally once a day  · 	Crestor 20 mg oral tablet: Last Dose Taken:  , 1 tab(s) orally once a day    .    Patient History:    Past Medical, Past Surgical, and Family History:  PAST MEDICAL HISTORY:  CAD (Coronary Artery Disease)     Hypercholesteremia.     PAST SURGICAL HISTORY:  CABG (Coronary Artery Bypass Graft)     CAD (Coronary Artery Disease).     FAMILY HISTORY:  FH: type 1 diabetes    Father  Still living? Unknown  FH: type 2 diabetes, Age at diagnosis: Age Unknown.     Social History:  · Substance use	No  · Social History (marital status, living situation, occupation, and sexual history)	ADLs: independent  Diet: avoids red meat     Tobacco Screening:  · Core Measure Site	Yes  · Has the patient used tobacco in the past 30 days?	No    Risk Assessment:    Present on Admission:  Deep Venous Thrombosis	no  Pulmonary Embolus	no     HIV Screening:  · In accordance with [place] State law, we offer every patient who comes to our ED an HIV test. Would you like to be tested today?	Opt out     Hepatitis C Test Questions:  · In accordance with [facility] Law, we offer every patient a Hepatitis C test. Would you like to be tested today?	Opt out        Medication list         MEDICATIONS  (STANDING):  aspirin  chewable 81 milliGRAM(s) Oral daily  benzonatate 100 milliGRAM(s) Oral three times a day  dexAMETHasone     Tablet 6 milliGRAM(s) Oral daily  enoxaparin Injectable 40 milliGRAM(s) SubCutaneous every 24 hours  ezetimibe 5 milliGRAM(s) Oral daily  guaiFENesin  milliGRAM(s) Oral every 12 hours  remdesivir  IVPB   IV Intermittent   remdesivir  IVPB 100 milliGRAM(s) IV Intermittent every 24 hours  rosuvastatin 20 milliGRAM(s) Oral at bedtime  senna 2 Tablet(s) Oral at bedtime  sodium chloride 0.9%. 1000 milliLiter(s) (75 mL/Hr) IV Continuous <Continuous>    MEDICATIONS  (PRN):  acetaminophen     Tablet .. 650 milliGRAM(s) Oral every 6 hours PRN Temp greater or equal to 38C (100.4F), Mild Pain (1 - 3)  benzocaine/menthol Lozenge 1 Lozenge Oral three times a day PRN Sore Throat  melatonin 3 milliGRAM(s) Oral at bedtime PRN Insomnia  polyethylene glycol 3350 17 Gram(s) Oral daily PRN Constipation         Vitals log        ICU Vital Signs Last 24 Hrs  T(C): 36.8 (20 Aug 2024 11:06), Max: 39 (19 Aug 2024 21:25)  T(F): 98.2 (20 Aug 2024 11:06), Max: 102.2 (19 Aug 2024 21:25)  HR: 67 (20 Aug 2024 11:06) (60 - 77)  BP: 102/60 (20 Aug 2024 11:06) (102/60 - 132/72)  BP(mean): --  ABP: --  ABP(mean): --  RR: 22 (20 Aug 2024 11:06) (16 - 29)  SpO2: 91% (20 Aug 2024 11:06) (87% - 96%)    O2 Parameters below as of 20 Aug 2024 11:06  Patient On (Oxygen Delivery Method): room air                 Input and Output:  I&O's Detail    19 Aug 2024 07:01  -  20 Aug 2024 07:00  --------------------------------------------------------  IN:  Total IN: 0 mL    OUT:    Voided (mL): 580 mL  Total OUT: 580 mL    Total NET: -580 mL      20 Aug 2024 07:01  -  20 Aug 2024 12:36  --------------------------------------------------------  IN:    Oral Fluid: 240 mL  Total IN: 240 mL    OUT:  Total OUT: 0 mL    Total NET: 240 mL          Lab Data                        15.1   5.13  )-----------( 154      ( 20 Aug 2024 05:20 )             44.8     08-20    140  |  105  |  15  ----------------------------<  89  3.8   |  29  |  0.81    Ca    8.8      20 Aug 2024 05:20    TPro  6.6  /  Alb  2.7<L>  /  TBili  0.4  /  DBili  0.1  /  AST  41<H>  /  ALT  49  /  AlkPhos  50  08-20            Review of Systems	  cough  sob  grant  weakness  malaise      Objective     Physical Examination    on o2 support  heart s1s2  lung dc BS  head nc  verbal  alert  extr - no edema  abd soft  cn grossly int      Pertinent Lab findings & Imaging      [place]:  NO   Adequate UO     I&O's Detail    19 Aug 2024 07:01  -  20 Aug 2024 07:00  --------------------------------------------------------  IN:  Total IN: 0 mL    OUT:    Voided (mL): 580 mL  Total OUT: 580 mL    Total NET: -580 mL      20 Aug 2024 07:01  -  20 Aug 2024 12:36  --------------------------------------------------------  IN:    Oral Fluid: 240 mL  Total IN: 240 mL    OUT:  Total OUT: 0 mL    Total NET: 240 mL               Discussed with:     Cultures:	        Radiology          ACC: [id number] EXAM:  XR CHEST PORTABLE IMMED 1V   ORDERED BY: INGRID KILGORE     PROCEDURE DATE:  08/19/2024          INTERPRETATION:  AP chest on August 19, 2024 at 2:25 PM. Patient has   Covid and fever.    Heart magnified by technique. Sternotomy again noted.    Lungs are grossly clear and chest is similar to October 5, 2018.    IMPRESSION: No acute finding.    --- End of Report ---            ELAINE HEARD MD; Attending Radiologist  This document has been electronically signed. Aug 19 2024  2:40PM                    
[facility]  INFECTIOUS DISEASES   [address] [place] [address]  Tel: [phone]     Fax: [phone]  ======================================================  MD Win Mendes Kaushal, MD Cho, Michelle, MD   ======================================================    Assessment/Recommendations     63-year-old male with history of coronary artery disease s/p CABG dyslipidemia presents with cough chills fever that started 3 days prior to arrival after having sick contact with one of his friend 2 days prior to that who tested positive for COVID-19 and patient took 1 dose of Paxlovid on the day of arrival to the ED for severe weakness and progressive diarrhea without abdominal pain being admitted for    Acute Hypoxic respiratory failure  COVID-19 infection  B/L Viral Pneumonia     - COVID 19 PCR + on 8.19.24  - Vaccination status: vaccinated and boosted with Moderna in 2021  and 2022  - O2 requirement: 3LNC  - Remdesivir treatment: S/p 200 mg IV LD on 8.19 AND  MG iv DAILY   - Dexamethasone 6mg PO Daily x 10 days   - Anticoagulation: lOVENOX 40 MG SQ DAILY   - Microbiology and Radiology reviewed   - Continue supportive care measures including nut not limited to Incentive spirometry, antitussive medications, self proning, etc  - trend CBC with diff, CMP,  CRP, Ferritin,  procalcitonin q 48-72 hours (recommend one tomorrow morning)   - Avoid antibiotics unless there is a concern for a bacterial/fungal infection (ordered CT chest, PCT, and sputum Cx)     - Prognosis: good   - Plan discussed with primary team         Records, reports from primary team, nursing, consultant reviewed  Plan explained to patient   Case discussed with Primary team   _________________________________________________-  Patient is a 63y old  Male who presents with a chief complaint of COVID+ with associated hypoxia (20 Aug 2024 12:35)    HPI:  63 year old male with PMhx of CAD s/p CABG, HLD presents with cough, chills, fever, COVID+ 3 days ago and started on paxlovid. Endorses productive cough, weakness, fevers, chills. Denies CP, abdominal pain, n/v/d.    ED Course:   Vitals: BP: 121/67, HR: 68, Temp: 100.4, RR: 16, SpO2: 95% on  O2 (desatted to 87 on RA)  Labs:  wbc wnl, AST 60  CXR: no acute  Received in the ED:  tylenol 650 mg, tessalon perle 100 mg, guaifenesin 600 mg, NS bolus 1L x 2   (19 Aug 2024 16:25)    Infectious disease consulted for evaluation management of COVID-19 pneumonia.  Patient complains of persistent cough pleuritic chest pain phlegm production of tan-colored sputum.  Complains of fever has improved after antipyretics.  Denied any myalgia or arthralgia, loss of taste or smell.    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 650 milliGRAM(s) Oral every 6 hours PRN  aspirin  chewable 81 milliGRAM(s) Oral daily  benzocaine/menthol Lozenge 1 Lozenge Oral three times a day PRN  benzonatate 100 milliGRAM(s) Oral three times a day  dexAMETHasone     Tablet 6 milliGRAM(s) Oral daily  enoxaparin Injectable 40 milliGRAM(s) SubCutaneous every 24 hours  ezetimibe 5 milliGRAM(s) Oral daily  guaiFENesin  milliGRAM(s) Oral every 12 hours  melatonin 3 milliGRAM(s) Oral at bedtime PRN  polyethylene glycol 3350 17 Gram(s) Oral daily PRN  rosuvastatin 20 milliGRAM(s) Oral at bedtime  senna 2 Tablet(s) Oral at bedtime  sodium chloride 0.9%. 1000 milliLiter(s) IV Continuous <Continuous>        ____________________________________________  Physical Examination:    Vital Signs Last 24 Hrs  T(C): 36.8 (20 Aug 2024 11:06), Max: 39 (19 Aug 2024 21:25)  T(F): 98.2 (20 Aug 2024 11:06), Max: 102.2 (19 Aug 2024 21:25)  HR: 67 (20 Aug 2024 11:06) (60 - 77)  BP: 102/60 (20 Aug 2024 11:06) (102/60 - 132/72)  RR: 22 (20 Aug 2024 11:06) (16 - 29)  SpO2: 91% (20 Aug 2024 11:06) (87% - 96%)    O2 Parameters below as of 20 Aug 2024 11:06  Patient On (Oxygen Delivery Method): room air    General: mild distress while lying in bed  due to excessive coughing and also pleuritic CP  Neuro: AAO*4, No obvious acute motor deficit  HEENT: Pupils equal, reactive to light, Oral mucosa moist  PULM: Clear to auscultation bilaterally, no significant adventitious breath sounds   CVS: Regular rhythm and controlled rate  ABD: Soft, nondistended, nontender, normoactive bowel sounds, no CVA tenderness  EXT: No b/l LE edema, nontender with pedal pulse palpable   SKIN: Warm and well perfused, no acute rashes   NO obvious palpable lymphadenopathy     _______________________________________________________    Lab Results:                        15.1   5.13  )-----------( 154      ( 20 Aug 2024 05:20 )             44.8     08-20    140  |  105  |  15  ----------------------------<  89  3.8   |  29  |  0.81    Ca    8.8      20 Aug 2024 05:20    TPro  6.6  /  Alb  2.7<L>  /  TBili  0.4  /  DBili  0.1  /  AST  41<H>  /  ALT  49  /  AlkPhos  50  08-20    LIVER FUNCTIONS - ( 20 Aug 2024 05:20 )  Alb: 2.7 g/dL / Pro: 6.6 g/dL / ALK PHOS: 50 U/L / ALT: 49 U/L / AST: 41 U/L / GGT: x           PT/INR - ( 20 Aug 2024 05:20 )   PT: 13.7 sec;   INR: 1.21 ratio           Urinalysis Basic - ( 20 Aug 2024 05:20 )    Color: x / Appearance: x / SG: x / pH: x  Gluc: 89 mg/dL / Ketone: x  / Bili: x / Urobili: x   Blood: x / Protein: x / Nitrite: x   Leuk Esterase: x / RBC: x / WBC x   Sq Epi: x / Non Sq Epi: x / Bacteria: x        MICROBIOLOGY/PATHOLOGY/ RADIOLOGY: Reviewed

## 2024-08-20 NOTE — PROGRESS NOTE ADULT - PROBLEM SELECTOR PLAN 2
- Pt with hx CAD s/p CABG  - Continue home ASA   - Hold home statin until acute issues have resolved - Pt with hx CAD s/p CABG  - Continue home ASA   - Restarted home statin

## 2024-08-21 ENCOUNTER — TRANSCRIPTION ENCOUNTER (OUTPATIENT)
Age: 64
End: 2024-08-21

## 2024-08-21 LAB
ALBUMIN SERPL ELPH-MCNC: 2.6 G/DL — LOW (ref 3.3–5)
ALBUMIN SERPL ELPH-MCNC: 2.7 G/DL — LOW (ref 3.3–5)
ALP SERPL-CCNC: 46 U/L — SIGNIFICANT CHANGE UP (ref 40–120)
ALP SERPL-CCNC: 47 U/L — SIGNIFICANT CHANGE UP (ref 40–120)
ALT FLD-CCNC: 40 U/L — SIGNIFICANT CHANGE UP (ref 12–78)
ALT FLD-CCNC: 41 U/L — SIGNIFICANT CHANGE UP (ref 12–78)
ANION GAP SERPL CALC-SCNC: 7 MMOL/L — SIGNIFICANT CHANGE UP (ref 5–17)
AST SERPL-CCNC: 26 U/L — SIGNIFICANT CHANGE UP (ref 15–37)
AST SERPL-CCNC: 26 U/L — SIGNIFICANT CHANGE UP (ref 15–37)
BASOPHILS # BLD AUTO: 0.01 K/UL — SIGNIFICANT CHANGE UP (ref 0–0.2)
BASOPHILS NFR BLD AUTO: 0.1 % — SIGNIFICANT CHANGE UP (ref 0–2)
BILIRUB DIRECT SERPL-MCNC: 0.1 MG/DL — SIGNIFICANT CHANGE UP (ref 0–0.3)
BILIRUB INDIRECT FLD-MCNC: 0.4 MG/DL — SIGNIFICANT CHANGE UP (ref 0.2–1)
BILIRUB SERPL-MCNC: 0.4 MG/DL — SIGNIFICANT CHANGE UP (ref 0.2–1.2)
BILIRUB SERPL-MCNC: 0.5 MG/DL — SIGNIFICANT CHANGE UP (ref 0.2–1.2)
BUN SERPL-MCNC: 21 MG/DL — SIGNIFICANT CHANGE UP (ref 7–23)
CALCIUM SERPL-MCNC: 8.8 MG/DL — SIGNIFICANT CHANGE UP (ref 8.5–10.1)
CHLORIDE SERPL-SCNC: 104 MMOL/L — SIGNIFICANT CHANGE UP (ref 96–108)
CO2 SERPL-SCNC: 32 MMOL/L — HIGH (ref 22–31)
CREAT SERPL-MCNC: 0.89 MG/DL — SIGNIFICANT CHANGE UP (ref 0.5–1.3)
EGFR: 95 ML/MIN/1.73M2 — SIGNIFICANT CHANGE UP
EOSINOPHIL # BLD AUTO: 0 K/UL — SIGNIFICANT CHANGE UP (ref 0–0.5)
EOSINOPHIL NFR BLD AUTO: 0 % — SIGNIFICANT CHANGE UP (ref 0–6)
GLUCOSE SERPL-MCNC: 161 MG/DL — HIGH (ref 70–99)
GRAM STN FLD: ABNORMAL
HCT VFR BLD CALC: 44.3 % — SIGNIFICANT CHANGE UP (ref 39–50)
HGB BLD-MCNC: 14.9 G/DL — SIGNIFICANT CHANGE UP (ref 13–17)
IMM GRANULOCYTES NFR BLD AUTO: 0.5 % — SIGNIFICANT CHANGE UP (ref 0–0.9)
INR BLD: 1.22 RATIO — HIGH (ref 0.85–1.18)
LYMPHOCYTES # BLD AUTO: 0.94 K/UL — LOW (ref 1–3.3)
LYMPHOCYTES # BLD AUTO: 9.2 % — LOW (ref 13–44)
MCHC RBC-ENTMCNC: 30.6 PG — SIGNIFICANT CHANGE UP (ref 27–34)
MCHC RBC-ENTMCNC: 33.6 GM/DL — SIGNIFICANT CHANGE UP (ref 32–36)
MCV RBC AUTO: 91 FL — SIGNIFICANT CHANGE UP (ref 80–100)
MONOCYTES # BLD AUTO: 1.06 K/UL — HIGH (ref 0–0.9)
MONOCYTES NFR BLD AUTO: 10.4 % — SIGNIFICANT CHANGE UP (ref 2–14)
NEUTROPHILS # BLD AUTO: 8.13 K/UL — HIGH (ref 1.8–7.4)
NEUTROPHILS NFR BLD AUTO: 79.8 % — HIGH (ref 43–77)
NRBC # BLD: 0 /100 WBCS — SIGNIFICANT CHANGE UP (ref 0–0)
PLATELET # BLD AUTO: 172 K/UL — SIGNIFICANT CHANGE UP (ref 150–400)
POTASSIUM SERPL-MCNC: 3.8 MMOL/L — SIGNIFICANT CHANGE UP (ref 3.5–5.3)
POTASSIUM SERPL-SCNC: 3.8 MMOL/L — SIGNIFICANT CHANGE UP (ref 3.5–5.3)
PROCALCITONIN SERPL-MCNC: 0.1 NG/ML — HIGH
PROT SERPL-MCNC: 6.5 G/DL — SIGNIFICANT CHANGE UP (ref 6–8.3)
PROT SERPL-MCNC: 6.6 G/DL — SIGNIFICANT CHANGE UP (ref 6–8.3)
PROTHROM AB SERPL-ACNC: 13.8 SEC — HIGH (ref 9.5–13)
RBC # BLD: 4.87 M/UL — SIGNIFICANT CHANGE UP (ref 4.2–5.8)
RBC # FLD: 12.4 % — SIGNIFICANT CHANGE UP (ref 10.3–14.5)
SODIUM SERPL-SCNC: 143 MMOL/L — SIGNIFICANT CHANGE UP (ref 135–145)
SPECIMEN SOURCE: SIGNIFICANT CHANGE UP
WBC # BLD: 10.19 K/UL — SIGNIFICANT CHANGE UP (ref 3.8–10.5)
WBC # FLD AUTO: 10.19 K/UL — SIGNIFICANT CHANGE UP (ref 3.8–10.5)

## 2024-08-21 PROCEDURE — 99232 SBSQ HOSP IP/OBS MODERATE 35: CPT | Mod: GC

## 2024-08-21 PROCEDURE — 99233 SBSQ HOSP IP/OBS HIGH 50: CPT

## 2024-08-21 RX ORDER — FAMOTIDINE 10 MG/ML
20 INJECTION INTRAVENOUS DAILY
Refills: 0 | Status: DISCONTINUED | OUTPATIENT
Start: 2024-08-21 | End: 2024-08-25

## 2024-08-21 RX ADMIN — Medication 3 MILLIGRAM(S): at 21:57

## 2024-08-21 RX ADMIN — Medication 1 LOZENGE: at 07:04

## 2024-08-21 RX ADMIN — GUAIFENESIN 600 MILLIGRAM(S): 100 LIQUID ORAL at 18:25

## 2024-08-21 RX ADMIN — ROSUVASTATIN CALCIUM 20 MILLIGRAM(S): 10 TABLET ORAL at 21:57

## 2024-08-21 RX ADMIN — GUAIFENESIN 600 MILLIGRAM(S): 100 LIQUID ORAL at 07:04

## 2024-08-21 RX ADMIN — Medication 81 MILLIGRAM(S): at 12:41

## 2024-08-21 RX ADMIN — FAMOTIDINE 20 MILLIGRAM(S): 10 INJECTION INTRAVENOUS at 12:41

## 2024-08-21 RX ADMIN — Medication 100 MILLIGRAM(S): at 07:04

## 2024-08-21 RX ADMIN — Medication 100 MILLIGRAM(S): at 16:20

## 2024-08-21 RX ADMIN — REMDESIVIR 200 MILLIGRAM(S): 5 INJECTION INTRAVENOUS at 18:14

## 2024-08-21 RX ADMIN — ENOXAPARIN SODIUM 40 MILLIGRAM(S): 100 INJECTION SUBCUTANEOUS at 21:57

## 2024-08-21 RX ADMIN — Medication 100 MILLIGRAM(S): at 21:56

## 2024-08-21 RX ADMIN — EZETIMIBE 5 MILLIGRAM(S): 10 TABLET ORAL at 12:40

## 2024-08-21 RX ADMIN — Medication 6 MILLIGRAM(S): at 07:04

## 2024-08-21 NOTE — PROGRESS NOTE ADULT - SUBJECTIVE AND OBJECTIVE BOX
Patient is a 63y old  Male who presents with a chief complaint of COVID+ with associated hypoxia (21 Aug 2024 12:29)      INTERVAL HPI/OVERNIGHT EVENTS: Patient seen and examined at bedside. No overnight events occurred. Patient has endorsed an improvement in symptoms, he reports feeling better today but states that he is experiencing persistent cough. He also complains of reflux in the evenings and is requesting medication for it. Denies fevers, chills, headache, lightheadedness, chest pain, dyspnea, abdominal pain, n/v/d/c.    MEDICATIONS  (STANDING):  aspirin  chewable 81 milliGRAM(s) Oral daily  benzocaine/menthol Lozenge 1 Lozenge Oral three times a day  benzonatate 100 milliGRAM(s) Oral three times a day  dexAMETHasone     Tablet 6 milliGRAM(s) Oral daily  enoxaparin Injectable 40 milliGRAM(s) SubCutaneous every 24 hours  ezetimibe 5 milliGRAM(s) Oral daily  famotidine    Tablet 20 milliGRAM(s) Oral daily  guaiFENesin  milliGRAM(s) Oral every 12 hours  remdesivir  IVPB   IV Intermittent   remdesivir  IVPB 100 milliGRAM(s) IV Intermittent every 24 hours  rosuvastatin 20 milliGRAM(s) Oral at bedtime  senna 2 Tablet(s) Oral at bedtime  sodium chloride 0.9%. 1000 milliLiter(s) (75 mL/Hr) IV Continuous <Continuous>    MEDICATIONS  (PRN):  acetaminophen     Tablet .. 650 milliGRAM(s) Oral every 6 hours PRN Temp greater or equal to 38C (100.4F), Mild Pain (1 - 3)  melatonin 3 milliGRAM(s) Oral at bedtime PRN Insomnia  polyethylene glycol 3350 17 Gram(s) Oral daily PRN Constipation      Allergies    No Known Allergies    Intolerances        REVIEW OF SYSTEMS:  CONSTITUTIONAL: No fever or chills  HEENT:  No headache, no sore throat  RESPIRATORY: (+) cough, no wheezing, or shortness of breath  CARDIOVASCULAR: No chest pain, palpitations  GASTROINTESTINAL: No abd pain, nausea, vomiting, or diarrhea; (+) reflux  GENITOURINARY: No dysuria, frequency, or hematuria  NEUROLOGICAL: no focal weakness or dizziness  MUSCULOSKELETAL: no myalgias     Vital Signs Last 24 Hrs  T(C): 36.4 (21 Aug 2024 12:52), Max: 36.7 (20 Aug 2024 20:31)  T(F): 97.6 (21 Aug 2024 12:52), Max: 98 (20 Aug 2024 20:31)  HR: 58 (21 Aug 2024 12:52) (58 - 77)  BP: 115/68 (21 Aug 2024 12:52) (100/52 - 130/72)  BP(mean): --  RR: 21 (21 Aug 2024 12:52) (18 - 21)  SpO2: 94% (21 Aug 2024 12:52) (93% - 99%)    Parameters below as of 21 Aug 2024 12:52  Patient On (Oxygen Delivery Method): nasal cannula  O2 Flow (L/min): 2      PHYSICAL EXAM:  GENERAL: NAD  HEENT:  anicteric, moist mucous membranes  CHEST/LUNG:  CTA b/l, no rales, wheezes, or rhonchi  HEART:  RRR, S1, S2  ABDOMEN:  BS+, soft, nontender, nondistended  EXTREMITIES: no edema, cyanosis, or calf tenderness  NERVOUS SYSTEM: answers questions and follows commands appropriately    LABS:                        14.9   10.19 )-----------( 172      ( 21 Aug 2024 12:20 )             44.3     CBC Full  -  ( 21 Aug 2024 12:20 )  WBC Count : 10.19 K/uL  Hemoglobin : 14.9 g/dL  Hematocrit : 44.3 %  Platelet Count - Automated : 172 K/uL  Mean Cell Volume : 91.0 fl  Mean Cell Hemoglobin : 30.6 pg  Mean Cell Hemoglobin Concentration : 33.6 gm/dL  Auto Neutrophil # : 8.13 K/uL  Auto Lymphocyte # : 0.94 K/uL  Auto Monocyte # : 1.06 K/uL  Auto Eosinophil # : 0.00 K/uL  Auto Basophil # : 0.01 K/uL  Auto Neutrophil % : 79.8 %  Auto Lymphocyte % : 9.2 %  Auto Monocyte % : 10.4 %  Auto Eosinophil % : 0.0 %  Auto Basophil % : 0.1 %    21 Aug 2024 12:20    143    |  104    |  21     ----------------------------<  161    3.8     |  32     |  0.89     Ca    8.8        21 Aug 2024 12:20    TPro  6.6    /  Alb  2.7    /  TBili  0.4    /  DBili  0.1    /  AST  26     /  ALT  41     /  AlkPhos  46     21 Aug 2024 12:20    PT/INR - ( 21 Aug 2024 12:20 )   PT: 13.8 sec;   INR: 1.22 ratio           Urinalysis Basic - ( 21 Aug 2024 12:20 )    Color: x / Appearance: x / SG: x / pH: x  Gluc: 161 mg/dL / Ketone: x  / Bili: x / Urobili: x   Blood: x / Protein: x / Nitrite: x   Leuk Esterase: x / RBC: x / WBC x   Sq Epi: x / Non Sq Epi: x / Bacteria: x      CAPILLARY BLOOD GLUCOSE        RADIOLOGY & ADDITIONAL TESTS:    Personally reviewed.     Consultant(s) Notes Reviewed:  [x] YES  [ ] NO     Patient is a 63y old  Male who presents with a chief complaint of COVID+ with associated hypoxia (21 Aug 2024 12:29)      INTERVAL HPI/OVERNIGHT EVENTS: Patient seen and examined at bedside. No overnight events occurred. Patient has endorsed an improvement in symptoms, he reports feeling better today but states that he is experiencing persistent cough. He also complains of reflux in the evenings and is requesting medication for it. Denies fevers, chills, headache, lightheadedness, chest pain, dyspnea, abdominal pain, n/v/d/c.    MEDICATIONS  (STANDING):  aspirin  chewable 81 milliGRAM(s) Oral daily  benzocaine/menthol Lozenge 1 Lozenge Oral three times a day  benzonatate 100 milliGRAM(s) Oral three times a day  dexAMETHasone     Tablet 6 milliGRAM(s) Oral daily  enoxaparin Injectable 40 milliGRAM(s) SubCutaneous every 24 hours  ezetimibe 5 milliGRAM(s) Oral daily  famotidine    Tablet 20 milliGRAM(s) Oral daily  guaiFENesin  milliGRAM(s) Oral every 12 hours  remdesivir  IVPB   IV Intermittent   remdesivir  IVPB 100 milliGRAM(s) IV Intermittent every 24 hours  rosuvastatin 20 milliGRAM(s) Oral at bedtime  senna 2 Tablet(s) Oral at bedtime  sodium chloride 0.9%. 1000 milliLiter(s) (75 mL/Hr) IV Continuous <Continuous>    MEDICATIONS  (PRN):  acetaminophen     Tablet .. 650 milliGRAM(s) Oral every 6 hours PRN Temp greater or equal to 38C (100.4F), Mild Pain (1 - 3)  melatonin 3 milliGRAM(s) Oral at bedtime PRN Insomnia  polyethylene glycol 3350 17 Gram(s) Oral daily PRN Constipation      Allergies    No Known Allergies    Intolerances        REVIEW OF SYSTEMS:  CONSTITUTIONAL: No fever or chills  HEENT:  No headache, no sore throat  RESPIRATORY: (+) cough, no wheezing, or shortness of breath  CARDIOVASCULAR: No chest pain, palpitations  GASTROINTESTINAL: No abd pain, nausea, vomiting, or diarrhea; (+) reflux  GENITOURINARY: No dysuria, frequency, or hematuria  NEUROLOGICAL: no focal weakness or dizziness  MUSCULOSKELETAL: no myalgias     Vital Signs Last 24 Hrs  T(C): 36.4 (21 Aug 2024 12:52), Max: 36.7 (20 Aug 2024 20:31)  T(F): 97.6 (21 Aug 2024 12:52), Max: 98 (20 Aug 2024 20:31)  HR: 58 (21 Aug 2024 12:52) (58 - 77)  BP: 115/68 (21 Aug 2024 12:52) (100/52 - 130/72)  BP(mean): --  RR: 21 (21 Aug 2024 12:52) (18 - 21)  SpO2: 94% (21 Aug 2024 12:52) (93% - 99%)    Parameters below as of 21 Aug 2024 12:52  Patient On (Oxygen Delivery Method): nasal cannula  O2 Flow (L/min): 2      PHYSICAL EXAM:  GENERAL: NAD  HEENT:  anicteric, moist mucous membranes  CHEST/LUNG:  diminished BS b/l, no rales, wheezes, or rhonchi  HEART:  RRR, S1, S2  ABDOMEN:  BS+, soft, nontender, nondistended  EXTREMITIES: no edema, cyanosis, or calf tenderness  NERVOUS SYSTEM: answers questions and follows commands appropriately    LABS:                        14.9   10.19 )-----------( 172      ( 21 Aug 2024 12:20 )             44.3     CBC Full  -  ( 21 Aug 2024 12:20 )  WBC Count : 10.19 K/uL  Hemoglobin : 14.9 g/dL  Hematocrit : 44.3 %  Platelet Count - Automated : 172 K/uL  Mean Cell Volume : 91.0 fl  Mean Cell Hemoglobin : 30.6 pg  Mean Cell Hemoglobin Concentration : 33.6 gm/dL  Auto Neutrophil # : 8.13 K/uL  Auto Lymphocyte # : 0.94 K/uL  Auto Monocyte # : 1.06 K/uL  Auto Eosinophil # : 0.00 K/uL  Auto Basophil # : 0.01 K/uL  Auto Neutrophil % : 79.8 %  Auto Lymphocyte % : 9.2 %  Auto Monocyte % : 10.4 %  Auto Eosinophil % : 0.0 %  Auto Basophil % : 0.1 %    21 Aug 2024 12:20    143    |  104    |  21     ----------------------------<  161    3.8     |  32     |  0.89     Ca    8.8        21 Aug 2024 12:20    TPro  6.6    /  Alb  2.7    /  TBili  0.4    /  DBili  0.1    /  AST  26     /  ALT  41     /  AlkPhos  46     21 Aug 2024 12:20    PT/INR - ( 21 Aug 2024 12:20 )   PT: 13.8 sec;   INR: 1.22 ratio           Urinalysis Basic - ( 21 Aug 2024 12:20 )    Color: x / Appearance: x / SG: x / pH: x  Gluc: 161 mg/dL / Ketone: x  / Bili: x / Urobili: x   Blood: x / Protein: x / Nitrite: x   Leuk Esterase: x / RBC: x / WBC x   Sq Epi: x / Non Sq Epi: x / Bacteria: x      CAPILLARY BLOOD GLUCOSE        RADIOLOGY & ADDITIONAL TESTS:    Personally reviewed.     Consultant(s) Notes Reviewed:  [x] YES  [ ] NO

## 2024-08-21 NOTE — DISCHARGE NOTE PROVIDER - NSDCCPCAREPLAN_GEN_ALL_CORE_FT
PRINCIPAL DISCHARGE DIAGNOSIS  Diagnosis: 2019 novel coronavirus disease (COVID-19)  Assessment and Plan of Treatment:       SECONDARY DISCHARGE DIAGNOSES  Diagnosis: Hypoxemia requiring supplemental oxygen  Assessment and Plan of Treatment:      PRINCIPAL DISCHARGE DIAGNOSIS  Diagnosis: 2019 novel coronavirus disease (COVID-19)  Assessment and Plan of Treatment: You were admitted for COVID- 19 pneumonia and were treated with Remdesivir and Decadron with improvement in your symptoms.   Please quarantine yourself for 2 weeks after hospital discharge or until directed by PCP.   You can take Tylenol 650 mg every 6 hours as needed for fever or pain. You can take over the counter Robitussin or Mucinex as needed for cough. Return to the emergency department if your symptoms worsen or if you notice your oxygen levels are below 90% on a pulse oximeter. Try to rest and keep hydrated. Please call your primary care doctor within 3-4 days after discharge.    Continue to practice social distancing. Wear a mask and wash your hands. Get the COVID vaccine and booster.      SECONDARY DISCHARGE DIAGNOSES  Diagnosis: Hypoxemia requiring supplemental oxygen  Assessment and Plan of Treatment: Hypoxemia is when your blood doesn’t have enough oxygen, and it may require supplemental oxygen to help you breathe better. Risk factors include viral diseases such as COVID, lung diseases, heart conditions, and high altitudes. It usually presents as shortness of breath, confusion, or bluish skin, especially around the lips and fingers.  Return to the emergency department if your symptoms worsen or if you notice your oxygen levels are below 90% on a pulse oximeter. Please call your primary care doctor within 3-4 days after discharge for routine post discharge follow up.     PRINCIPAL DISCHARGE DIAGNOSIS  Diagnosis: 2019 novel coronavirus disease (COVID-19)  Assessment and Plan of Treatment: You were admitted for COVID- 19 pneumonia and were treated with Remdesivir and Decadron with improvement in your symptoms.   Please quarantine yourself for 5 days after hospital discharge or until directed by PCP.   You can take Tylenol 650 mg every 6 hours as needed for fever or pain. You can take over the counter Robitussin or Mucinex as needed for cough. Return to the emergency department if your symptoms worsen or if you notice your oxygen levels are below 90% on a pulse oximeter. Try to rest and keep hydrated. Please call your primary care doctor within 7 days after discharge.    Continue to practice social distancing. Wear a mask and wash your hands. Get the COVID vaccine and booster.      SECONDARY DISCHARGE DIAGNOSES  Diagnosis: Hypoxemia requiring supplemental oxygen  Assessment and Plan of Treatment: Hypoxemia is when your blood doesn’t have enough oxygen, and it may require supplemental oxygen to help you breathe better. Risk factors include viral diseases such as COVID, lung diseases, heart conditions, and high altitudes. It usually presents as shortness of breath, confusion, or bluish skin, especially around the lips and fingers.  Return to the emergency department if your symptoms worsen or if you notice your oxygen levels are below 90% on a pulse oximeter. Please call your primary care doctor within 7 days after discharge for routine post discharge follow up.

## 2024-08-21 NOTE — DISCHARGE NOTE PROVIDER - ATTENDING DISCHARGE PHYSICAL EXAMINATION:
Vitals: T: 97.6  P: 69  BP: 120/73  RR: 17  SpO2: 93% RA  GENERAL: NAD  HEENT:  anicteric, moist mucous membranes  CHEST/LUNG: increase air entry, CTA bilaterally, no rales, wheezes, or rhonchi  HEART:  RRR, S1, S2  ABDOMEN:  BS+, soft, nontender, nondistended  EXTREMITIES: no edema, cyanosis, or calf tenderness  NERVOUS SYSTEM: answers questions and follows commands appropriately     Vitals: T: 97.6  P: 55  BP: 117/73  RR: 18  SpO2: 92% RA  GENERAL: NAD  HEENT:  anicteric, moist mucous membranes  CHEST/LUNG: increase air entry, CTA bilaterally, no rales, wheezes, or rhonchi  HEART:  RRR, S1, S2  ABDOMEN:  BS+, soft, nontender, nondistended  EXTREMITIES: no edema, cyanosis, or calf tenderness  NERVOUS SYSTEM: answers questions and follows commands appropriately

## 2024-08-21 NOTE — PROGRESS NOTE ADULT - SUBJECTIVE AND OBJECTIVE BOX
Date/Time Patient Seen:  		  Referring MD:   Data Reviewed	       Patient is a 63y old  Male who presents with a chief complaint of COVID+ with associated hypoxia (20 Aug 2024 12:45)      Subjective/HPI     PAST MEDICAL & SURGICAL HISTORY:  CAD (Coronary Artery Disease)    Hypercholesteremia    CAD (Coronary Artery Disease)    CABG (Coronary Artery Bypass Graft)          Medication list         MEDICATIONS  (STANDING):  aspirin  chewable 81 milliGRAM(s) Oral daily  benzocaine/menthol Lozenge 1 Lozenge Oral three times a day  benzonatate 100 milliGRAM(s) Oral three times a day  dexAMETHasone     Tablet 6 milliGRAM(s) Oral daily  enoxaparin Injectable 40 milliGRAM(s) SubCutaneous every 24 hours  ezetimibe 5 milliGRAM(s) Oral daily  guaiFENesin  milliGRAM(s) Oral every 12 hours  remdesivir  IVPB   IV Intermittent   remdesivir  IVPB 100 milliGRAM(s) IV Intermittent every 24 hours  rosuvastatin 20 milliGRAM(s) Oral at bedtime  senna 2 Tablet(s) Oral at bedtime  sodium chloride 0.9%. 1000 milliLiter(s) (75 mL/Hr) IV Continuous <Continuous>    MEDICATIONS  (PRN):  acetaminophen     Tablet .. 650 milliGRAM(s) Oral every 6 hours PRN Temp greater or equal to 38C (100.4F), Mild Pain (1 - 3)  melatonin 3 milliGRAM(s) Oral at bedtime PRN Insomnia  polyethylene glycol 3350 17 Gram(s) Oral daily PRN Constipation         Vitals log        ICU Vital Signs Last 24 Hrs  T(C): 36.7 (20 Aug 2024 20:31), Max: 36.8 (20 Aug 2024 11:06)  T(F): 98 (20 Aug 2024 20:31), Max: 98.2 (20 Aug 2024 11:06)  HR: 77 (20 Aug 2024 20:31) (67 - 77)  BP: 100/52 (20 Aug 2024 20:31) (100/52 - 102/60)  BP(mean): --  ABP: --  ABP(mean): --  RR: 18 (20 Aug 2024 20:31) (18 - 22)  SpO2: 99% (20 Aug 2024 20:31) (91% - 99%)    O2 Parameters below as of 20 Aug 2024 20:31  Patient On (Oxygen Delivery Method): room air                 Input and Output:  I&O's Detail    20 Aug 2024 07:01  -  21 Aug 2024 07:00  --------------------------------------------------------  IN:    Oral Fluid: 240 mL    sodium chloride 0.9%: 450 mL  Total IN: 690 mL    OUT:    Voided (mL): 600 mL  Total OUT: 600 mL    Total NET: 90 mL          Lab Data                        15.1   5.13  )-----------( 154      ( 20 Aug 2024 05:20 )             44.8     08-20    140  |  105  |  15  ----------------------------<  89  3.8   |  29  |  0.81    Ca    8.8      20 Aug 2024 05:20    TPro  6.6  /  Alb  2.7<L>  /  TBili  0.4  /  DBili  0.1  /  AST  41<H>  /  ALT  49  /  AlkPhos  50  08-20            Review of Systems	      Objective     Physical Examination    heart s1s2  lung dc BS  head nc  head at  verbal      Pertinent Lab findings & Imaging      [place]:  NO   Adequate UO     I&O's Detail    20 Aug 2024 07:01  -  21 Aug 2024 07:00  --------------------------------------------------------  IN:    Oral Fluid: 240 mL    sodium chloride 0.9%: 450 mL  Total IN: 690 mL    OUT:    Voided (mL): 600 mL  Total OUT: 600 mL    Total NET: 90 mL               Discussed with:     Cultures:	        Radiology

## 2024-08-21 NOTE — DISCHARGE NOTE PROVIDER - HOSPITAL COURSE
HPI:  63 year old male with PMhx of CAD s/p CABG, HLD presents with cough, chills, fever, COVID+ 3 days ago and started on paxlovid. Endorses productive cough, weakness, fevers, chills. Denies CP, abdominal pain, n/v/d.    ED Course:   Vitals: BP: 121/67, HR: 68, Temp: 100.4, RR: 16, SpO2: 95% on  O2 (desatted to 87 on RA)  Labs:  wbc wnl, AST 60  CXR: no acute  Received in the ED:  tylenol 650 mg, tessalon perle 100 mg, guaifenesin 600 mg, NS bolus 1L x 2   (19 Aug 2024 16:25)      ---  HOSPITAL COURSE:       ---  CONSULTANTS:     ---  TIME SPENT:  I, the attending physician, was physically present for the key portions of the evaluation and management (E/M) service provided. The total amount of time spent reviewing the hospital notes, laboratory values, imaging findings, assessing/counseling the patient, discussing with consultant physicians, social work, nursing staff was -- minutes    ---  Primary care provider was made aware of plan for discharge:      [  ] NO     [  ] YES   HPI:  63 year old male with PMhx of CAD s/p CABG, HLD presents with cough, chills, fever, COVID+ 3 days ago and started on paxlovid. Endorses productive cough, weakness, fevers, chills. Denies CP, abdominal pain, n/v/d.    ED Course:   Vitals: BP: 121/67, HR: 68, Temp: 100.4, RR: 16, SpO2: 95% on  O2 (desatted to 87 on RA)  Labs:  wbc wnl, AST 60  CXR: no acute  Received in the ED:  tylenol 650 mg, tessalon perle 100 mg, guaifenesin 600 mg, NS bolus 1L x 2   (19 Aug 2024 16:25)      ---  HOSPITAL COURSE:       ---  CONSULTANTS:   (Pulmonologist)    ---  TIME SPENT:  I, the attending physician, was physically present for the key portions of the evaluation and management (E/M) service provided. The total amount of time spent reviewing the hospital notes, laboratory values, imaging findings, assessing/counseling the patient, discussing with consultant physicians, social work, nursing staff was -- minutes    ---  Primary care provider was made aware of plan for discharge:      [  ] NO     [  ] YES   HPI:  63 year old male with PMHx of CAD s/p CABG, HLD presents with cough, chills, fever, COVID+ 3 days ago and started on paxlovid. Endorses productive cough, weakness, fevers, chills. Denies CP, abdominal pain, n/v/d.    ED Course:   Vitals: BP: 121/67, HR: 68, Temp: 100.4, RR: 16, SpO2: 95% on  O2 (desatted to 87 on RA)  Labs:  wbc wnl, AST 60  CXR: no acute  Received in the ED:  tylenol 650 mg, tessalon perle 100 mg, guaifenesin 600 mg, NS bolus 1L x 2   (19 Aug 2024 16:25)      ---  HOSPITAL COURSE: Patient is a 64 yo M with PMH of CAD s/p CABG, and HLD who was admitted for COVID+ with associated hypoxia. Patient was symptomatic with productive cough, weakness and fevers. Pulmonology and infectious disease were consulted. Pt required supplemental oxygen (max 3L NC) and was slowly titrated down during hospital stay. Remdesevir and decadron were given for treatment of symptomatic covid with clinical improvement of symptoms and resolution of fevers and weakness. Patient to be discharged with 5 days of decadron to complete 10 day course and recommended to follow up with PCP post discharge.      ---  CONSULTANTS:   Dr. Ivy (Pulmonologist)  Dr. Walden (Infectious Disease)    ---  TIME SPENT:  I, the attending physician, was physically present for the key portions of the evaluation and management (E/M) service provided. The total amount of time spent reviewing the hospital notes, laboratory values, imaging findings, assessing/counseling the patient, discussing with consultant physicians, social work, nursing staff was -- minutes    ---  Primary care provider was made aware of plan for discharge:      [  ] NO     [  ] YES   HPI:  63 year old male with PMHx of CAD s/p CABG, HLD presents with cough, chills, fever, COVID+ 3 days ago and started on paxlovid. Endorses productive cough, weakness, fevers, chills. Denies CP, abdominal pain, n/v/d.    ED Course:   Vitals: BP: 121/67, HR: 68, Temp: 100.4, RR: 16, SpO2: 95% on  O2 (desatted to 87 on RA)  Labs:  wbc wnl, AST 60  CXR: no acute  Received in the ED:  tylenol 650 mg, tessalon perle 100 mg, guaifenesin 600 mg, NS bolus 1L x 2   (19 Aug 2024 16:25)      ---  HOSPITAL COURSE: Patient is a 64 yo M with PMH of CAD s/p CABG, and HLD who was admitted for COVID+ with associated hypoxia. Patient was symptomatic with productive cough, weakness and fevers. Pulmonology and infectious disease were consulted. Pt required supplemental oxygen (max 3L NC) and was slowly titrated down during hospital stay. Remdesevir and decadron were given for treatment of symptomatic covid with clinical improvement of symptoms and resolution of fevers and weakness. Patient to be discharged with 5 days of decadron to complete 10 day course and recommended to follow up with PCP post discharge.    On day of discharge patient is in no distress.  On room air and denies having SOB.   Remains afebrile and hemodynamically stable.      ---  CONSULTANTS:   Dr. Ivy (Pulmonologist)  Dr. Walden (Infectious Disease)    ---

## 2024-08-21 NOTE — DISCHARGE NOTE PROVIDER - CARE PROVIDER_API CALL
Salma Giang.  Family Medicine  [address], [address]  [place] [address]  Phone: [phone]  Fax: [phone]  Follow Up Time: 2 weeks   Salma Giang.  Family Medicine  [address], [address]  [place] [address]  Phone: [phone]  Fax: [phone]  Follow Up Time: 1 week

## 2024-08-21 NOTE — DISCHARGE NOTE PROVIDER - PROVIDER TOKENS
PROVIDER:[TOKEN:[[id number]],FOLLOWUP:[2 weeks]] PROVIDER:[TOKEN:[[id number]],FOLLOWUP:[1 week]]

## 2024-08-21 NOTE — PROGRESS NOTE ADULT - SUBJECTIVE AND OBJECTIVE BOX
[facility]  INFECTIOUS DISEASES   [address] [place] [address]  Tel: [phone]     Fax: [phone]  ======================================================  MD Win Mendes Kaushal, MD Cho, Michelle, MD   ======================================================    Assessment/Recommendations:     63-year-old male with history of coronary artery disease s/p CABG dyslipidemia presents with cough chills fever that started 3 days prior to arrival after having sick contact with one of his friend 2 days prior to that who tested positive for COVID-19 and patient took 1 dose of Paxlovid on the day of arrival to the ED for severe weakness and progressive diarrhea without abdominal pain being admitted for    Acute Hypoxic respiratory failure  COVID-19 infection  B/L Viral Pneumonia     Patient Seen and examined   WBC improved, afebrile, hemodynamically stable  Trend WBC and temperature curve through hospital course  Cultures: Pending sputum culture    Imaging:    CT Angio Chest PE Protocol w/ IV Cont (08.20.24 ): No acute pulmonary embolism. Dilated aortic root measuring 4.3 cm. Small bilateral pleural effusions with partial atelectasis lower lobes.     - Remdesivir treatment: S/p 200 mg IV LD on 8.19 AND  MG iv DAILY   - Dexamethasone 6mg PO Daily x 10 days   Continue with anticoagulation for DVT prophylaxis  Incentive spirometry     CT chest noted as above with overall clinical improvement, we will hold off on other antibiotics for now   trend procalcitonin    Plan explained to patient   D/w Primary team     ________________________________    Last 24 hours:    improved pleuritic chest pain cough as well as phlegm production  Denied any fever chills nausea vomiting  Diarrhea improved as well  Overall weakness improved  Denied any other new symptoms  Still remains hypoxic requiring 3 L nasal cannula    Further ROS:  All systems reviewed. No other complaints besides mentioned above     ______________________________  Allergies    No Known Allergies    Medications:   remdesivir  IVPB   IV Intermittent   remdesivir  IVPB 100 milliGRAM(s) IV Intermittent every 24 hours  acetaminophen     Tablet .. 650 milliGRAM(s) Oral every 6 hours PRN  aspirin  chewable 81 milliGRAM(s) Oral daily  benzocaine/menthol Lozenge 1 Lozenge Oral three times a day  benzonatate 100 milliGRAM(s) Oral three times a day  dexAMETHasone     Tablet 6 milliGRAM(s) Oral daily  enoxaparin Injectable 40 milliGRAM(s) SubCutaneous every 24 hours  ezetimibe 5 milliGRAM(s) Oral daily  famotidine    Tablet 20 milliGRAM(s) Oral daily  guaiFENesin  milliGRAM(s) Oral every 12 hours  melatonin 3 milliGRAM(s) Oral at bedtime PRN  polyethylene glycol 3350 17 Gram(s) Oral daily PRN  rosuvastatin 20 milliGRAM(s) Oral at bedtime  senna 2 Tablet(s) Oral at bedtime  sodium chloride 0.9%. 1000 milliLiter(s) IV Continuous <Continuous>      _________________    PHYSICAL EXAM:    Objective:  Vital Signs Last 24 Hrs  T(C): 36.1 (21 Aug 2024 07:00), Max: 36.7 (20 Aug 2024 20:31)  T(F): 97 (21 Aug 2024 07:00), Max: 98 (20 Aug 2024 20:31)  HR: 63 (21 Aug 2024 07:00) (63 - 77)  BP: 130/72 (21 Aug 2024 07:00) (100/52 - 130/72)  RR: 18 (21 Aug 2024 07:00) (18 - 18)  SpO2: 93% (21 Aug 2024 07:00) (93% - 99%)    Parameters below as of 21 Aug 2024 07:00  Patient On (Oxygen Delivery Method): nasal cannula  O2 Flow (L/min): 3      General: No acute distress. Sitting in bed comfortably   Neuro: AAO*3, No motor, sensory, or cranial nerve deficit  HEENT: Pupils equal, reactive to light, Oral mucosa moist  PULM: Clear to auscultation bilaterally, no significant adventitious breath sounds   CVS: Regular rhythm and controlled rate, no murmurs, rubs, or gallops  ABD: Soft, nondistended, nontender, normoactive bowel sounds, no CVA tenderness  EXT: No b/l LE edema, nontender with pedal pulse palpable   SKIN: Warm and well perfused, no acute rashes   ______________  LABS, MICROBIOLOGY, RADIOLOGY & ADDITIONAL STUDIES: Reviewed

## 2024-08-21 NOTE — DISCHARGE NOTE PROVIDER - CARE PROVIDERS DIRECT ADDRESSES
,[email or web address] [email or web address]

## 2024-08-21 NOTE — DISCHARGE NOTE PROVIDER - NSDCMRMEDTOKEN_GEN_ALL_CORE_FT
aspirin 81 mg oral tablet: 1 tab(s) orally once a day  Crestor 20 mg oral tablet: 1 tab(s) orally once a day  Zetia 10 mg oral tablet: 0.5 tab(s) orally once a day   aspirin 81 mg oral tablet, chewable: 1 tab(s) orally once a day  benzonatate 100 mg oral capsule: 1 cap(s) orally 3 times a day as needed for  cough  ezetimibe 10 mg oral tablet: 0.5 tab(s) orally once a day  guaiFENesin 600 mg oral tablet, extended release: 1 tab(s) orally every 12 hours  rosuvastatin 20 mg oral tablet: 1 tab(s) orally once a day (at bedtime)   aspirin 81 mg oral tablet, chewable: 1 tab(s) orally once a day  benzonatate 100 mg oral capsule: 1 cap(s) orally 3 times a day as needed for  cough  dexAMETHasone 6 mg oral tablet: 1 tab(s) orally once a day  ezetimibe 10 mg oral tablet: 0.5 tab(s) orally once a day  guaiFENesin 600 mg oral tablet, extended release: 1 tab(s) orally every 12 hours  rosuvastatin 20 mg oral tablet: 1 tab(s) orally once a day (at bedtime)

## 2024-08-21 NOTE — PROGRESS NOTE ADULT - PROBLEM SELECTOR PLAN 1
- Pt COVID+ 3 days ago, started on paxlovid, presenting with fevers, chills, cough, hypoxia  - Remdesivir x 5 days  - Guaifenesin 600mg BID  - Dexamethasone 6 mg x 10 days  - started tessalon perles  - Not on home O2; on 3L NC at home, will titrate him down  - CTA chest: No PE, small bilateral pleural effusions with partial atelectasis lower lobes. - Pt COVID+ 3 days ago, started on paxlovid, presenting with fevers, chills, cough, hypoxia  - Remdesivir x 5 days  - Guaifenesin 600mg BID  - Dexamethasone 6 mg x 10 days  - started tessalon perles  - Not on home O2; currently on 3L NC, will titrate him down  - CTA chest: No PE, small bilateral pleural effusions with partial atelectasis lower lobes.  - ID and Pulmonary f/u

## 2024-08-22 LAB
ALBUMIN SERPL ELPH-MCNC: 2.8 G/DL — LOW (ref 3.3–5)
ALP SERPL-CCNC: 54 U/L — SIGNIFICANT CHANGE UP (ref 40–120)
ALT FLD-CCNC: 43 U/L — SIGNIFICANT CHANGE UP (ref 12–78)
ANION GAP SERPL CALC-SCNC: 5 MMOL/L — SIGNIFICANT CHANGE UP (ref 5–17)
AST SERPL-CCNC: 30 U/L — SIGNIFICANT CHANGE UP (ref 15–37)
BASOPHILS # BLD AUTO: 0.01 K/UL — SIGNIFICANT CHANGE UP (ref 0–0.2)
BASOPHILS NFR BLD AUTO: 0.1 % — SIGNIFICANT CHANGE UP (ref 0–2)
BILIRUB SERPL-MCNC: 0.4 MG/DL — SIGNIFICANT CHANGE UP (ref 0.2–1.2)
BUN SERPL-MCNC: 25 MG/DL — HIGH (ref 7–23)
CALCIUM SERPL-MCNC: 9.5 MG/DL — SIGNIFICANT CHANGE UP (ref 8.5–10.1)
CHLORIDE SERPL-SCNC: 105 MMOL/L — SIGNIFICANT CHANGE UP (ref 96–108)
CO2 SERPL-SCNC: 31 MMOL/L — SIGNIFICANT CHANGE UP (ref 22–31)
CREAT SERPL-MCNC: 0.86 MG/DL — SIGNIFICANT CHANGE UP (ref 0.5–1.3)
EGFR: 96 ML/MIN/1.73M2 — SIGNIFICANT CHANGE UP
EOSINOPHIL # BLD AUTO: 0 K/UL — SIGNIFICANT CHANGE UP (ref 0–0.5)
EOSINOPHIL NFR BLD AUTO: 0 % — SIGNIFICANT CHANGE UP (ref 0–6)
GLUCOSE SERPL-MCNC: 128 MG/DL — HIGH (ref 70–99)
HCT VFR BLD CALC: 48.7 % — SIGNIFICANT CHANGE UP (ref 39–50)
HGB BLD-MCNC: 16.5 G/DL — SIGNIFICANT CHANGE UP (ref 13–17)
IMM GRANULOCYTES NFR BLD AUTO: 0.8 % — SIGNIFICANT CHANGE UP (ref 0–0.9)
INR BLD: 1.1 RATIO — SIGNIFICANT CHANGE UP (ref 0.85–1.18)
LYMPHOCYTES # BLD AUTO: 1.2 K/UL — SIGNIFICANT CHANGE UP (ref 1–3.3)
LYMPHOCYTES # BLD AUTO: 10.2 % — LOW (ref 13–44)
MAGNESIUM SERPL-MCNC: 2.1 MG/DL — SIGNIFICANT CHANGE UP (ref 1.6–2.6)
MCHC RBC-ENTMCNC: 31 PG — SIGNIFICANT CHANGE UP (ref 27–34)
MCHC RBC-ENTMCNC: 33.9 GM/DL — SIGNIFICANT CHANGE UP (ref 32–36)
MCV RBC AUTO: 91.4 FL — SIGNIFICANT CHANGE UP (ref 80–100)
MONOCYTES # BLD AUTO: 0.55 K/UL — SIGNIFICANT CHANGE UP (ref 0–0.9)
MONOCYTES NFR BLD AUTO: 4.7 % — SIGNIFICANT CHANGE UP (ref 2–14)
NEUTROPHILS # BLD AUTO: 9.93 K/UL — HIGH (ref 1.8–7.4)
NEUTROPHILS NFR BLD AUTO: 84.2 % — HIGH (ref 43–77)
NRBC # BLD: 0 /100 WBCS — SIGNIFICANT CHANGE UP (ref 0–0)
PLATELET # BLD AUTO: 192 K/UL — SIGNIFICANT CHANGE UP (ref 150–400)
POTASSIUM SERPL-MCNC: 3.8 MMOL/L — SIGNIFICANT CHANGE UP (ref 3.5–5.3)
POTASSIUM SERPL-SCNC: 3.8 MMOL/L — SIGNIFICANT CHANGE UP (ref 3.5–5.3)
PROCALCITONIN SERPL-MCNC: 0.07 NG/ML — SIGNIFICANT CHANGE UP
PROT SERPL-MCNC: 7.1 G/DL — SIGNIFICANT CHANGE UP (ref 6–8.3)
PROTHROM AB SERPL-ACNC: 12.5 SEC — SIGNIFICANT CHANGE UP (ref 9.5–13)
RBC # BLD: 5.33 M/UL — SIGNIFICANT CHANGE UP (ref 4.2–5.8)
RBC # FLD: 12.2 % — SIGNIFICANT CHANGE UP (ref 10.3–14.5)
SODIUM SERPL-SCNC: 141 MMOL/L — SIGNIFICANT CHANGE UP (ref 135–145)
WBC # BLD: 11.78 K/UL — HIGH (ref 3.8–10.5)
WBC # FLD AUTO: 11.78 K/UL — HIGH (ref 3.8–10.5)

## 2024-08-22 PROCEDURE — 99232 SBSQ HOSP IP/OBS MODERATE 35: CPT | Mod: GC

## 2024-08-22 PROCEDURE — 99233 SBSQ HOSP IP/OBS HIGH 50: CPT

## 2024-08-22 RX ADMIN — EZETIMIBE 5 MILLIGRAM(S): 10 TABLET ORAL at 13:15

## 2024-08-22 RX ADMIN — Medication 100 MILLIGRAM(S): at 06:13

## 2024-08-22 RX ADMIN — Medication 81 MILLIGRAM(S): at 12:04

## 2024-08-22 RX ADMIN — ENOXAPARIN SODIUM 40 MILLIGRAM(S): 100 INJECTION SUBCUTANEOUS at 21:27

## 2024-08-22 RX ADMIN — GUAIFENESIN 600 MILLIGRAM(S): 100 LIQUID ORAL at 18:17

## 2024-08-22 RX ADMIN — GUAIFENESIN 600 MILLIGRAM(S): 100 LIQUID ORAL at 06:13

## 2024-08-22 RX ADMIN — FAMOTIDINE 20 MILLIGRAM(S): 10 INJECTION INTRAVENOUS at 12:04

## 2024-08-22 RX ADMIN — Medication 6 MILLIGRAM(S): at 06:13

## 2024-08-22 RX ADMIN — Medication 100 MILLIGRAM(S): at 13:17

## 2024-08-22 RX ADMIN — ROSUVASTATIN CALCIUM 20 MILLIGRAM(S): 10 TABLET ORAL at 21:27

## 2024-08-22 RX ADMIN — Medication 100 MILLIGRAM(S): at 21:27

## 2024-08-22 RX ADMIN — REMDESIVIR 200 MILLIGRAM(S): 5 INJECTION INTRAVENOUS at 18:17

## 2024-08-22 NOTE — PROGRESS NOTE ADULT - SUBJECTIVE AND OBJECTIVE BOX
Patient is a 63y old  Male who presents with a chief complaint of COVID+ with associated hypoxia (22 Aug 2024 08:34)      INTERVAL HPI/OVERNIGHT EVENTS: Patient seen and examined at bedside. No overnight events occurred. Patient has no complaints at this time. Denies fevers, chills, headache, lightheadedness, chest pain, dyspnea, abdominal pain, n/v/d/c.    MEDICATIONS  (STANDING):  aspirin  chewable 81 milliGRAM(s) Oral daily  benzocaine/menthol Lozenge 1 Lozenge Oral three times a day  benzonatate 100 milliGRAM(s) Oral three times a day  dexAMETHasone     Tablet 6 milliGRAM(s) Oral daily  enoxaparin Injectable 40 milliGRAM(s) SubCutaneous every 24 hours  ezetimibe 5 milliGRAM(s) Oral daily  famotidine    Tablet 20 milliGRAM(s) Oral daily  guaiFENesin  milliGRAM(s) Oral every 12 hours  remdesivir  IVPB   IV Intermittent   remdesivir  IVPB 100 milliGRAM(s) IV Intermittent every 24 hours  rosuvastatin 20 milliGRAM(s) Oral at bedtime  senna 2 Tablet(s) Oral at bedtime  sodium chloride 0.9%. 1000 milliLiter(s) (75 mL/Hr) IV Continuous <Continuous>    MEDICATIONS  (PRN):  acetaminophen     Tablet .. 650 milliGRAM(s) Oral every 6 hours PRN Temp greater or equal to 38C (100.4F), Mild Pain (1 - 3)  melatonin 3 milliGRAM(s) Oral at bedtime PRN Insomnia  polyethylene glycol 3350 17 Gram(s) Oral daily PRN Constipation      Allergies    No Known Allergies    Intolerances      REVIEW OF SYSTEMS:  CONSTITUTIONAL: No fever or chills  HEENT:  No headache, no sore throat  RESPIRATORY: (+) cough, no wheezing, or shortness of breath  CARDIOVASCULAR: No chest pain, palpitations  GASTROINTESTINAL: No abd pain, nausea, vomiting, or diarrhea; (+) reflux  GENITOURINARY: No dysuria, frequency, or hematuria  NEUROLOGICAL: no focal weakness or dizziness  MUSCULOSKELETAL: no myalgias     Vital Signs Last 24 Hrs  T(C): 36.5 (22 Aug 2024 06:18), Max: 36.7 (21 Aug 2024 20:33)  T(F): 97.7 (22 Aug 2024 06:18), Max: 98.1 (21 Aug 2024 20:33)  HR: 58 (22 Aug 2024 06:18) (58 - 58)  BP: 126/73 (22 Aug 2024 06:18) (113/66 - 126/73)  BP(mean): --  RR: 18 (22 Aug 2024 06:18) (18 - 21)  SpO2: 92% (22 Aug 2024 06:18) (92% - 95%)    Parameters below as of 22 Aug 2024 06:18  Patient On (Oxygen Delivery Method): nasal cannula  O2 Flow (L/min): 1      PHYSICAL EXAM:  GENERAL: NAD  HEENT:  anicteric, moist mucous membranes  CHEST/LUNG:  diminished BS b/l, no rales, wheezes, or rhonchi  HEART:  RRR, S1, S2  ABDOMEN:  BS+, soft, nontender, nondistended  EXTREMITIES: no edema, cyanosis, or calf tenderness  NERVOUS SYSTEM: answers questions and follows commands appropriately    LABS:                        16.5   11.78 )-----------( 192      ( 22 Aug 2024 08:30 )             48.7     CBC Full  -  ( 22 Aug 2024 08:30 )  WBC Count : 11.78 K/uL  Hemoglobin : 16.5 g/dL  Hematocrit : 48.7 %  Platelet Count - Automated : 192 K/uL  Mean Cell Volume : 91.4 fl  Mean Cell Hemoglobin : 31.0 pg  Mean Cell Hemoglobin Concentration : 33.9 gm/dL  Auto Neutrophil # : 9.93 K/uL  Auto Lymphocyte # : 1.20 K/uL  Auto Monocyte # : 0.55 K/uL  Auto Eosinophil # : 0.00 K/uL  Auto Basophil # : 0.01 K/uL  Auto Neutrophil % : 84.2 %  Auto Lymphocyte % : 10.2 %  Auto Monocyte % : 4.7 %  Auto Eosinophil % : 0.0 %  Auto Basophil % : 0.1 %    22 Aug 2024 08:30    141    |  105    |  25     ----------------------------<  128    3.8     |  31     |  0.86     Ca    9.5        22 Aug 2024 08:30  Mg     2.1       22 Aug 2024 08:30    TPro  7.1    /  Alb  2.8    /  TBili  0.4    /  DBili  0.1    /  AST  30     /  ALT  43     /  AlkPhos  54     22 Aug 2024 08:30    PT/INR - ( 22 Aug 2024 08:30 )   PT: 12.5 sec;   INR: 1.10 ratio           Urinalysis Basic - ( 22 Aug 2024 08:30 )    Color: x / Appearance: x / SG: x / pH: x  Gluc: 128 mg/dL / Ketone: x  / Bili: x / Urobili: x   Blood: x / Protein: x / Nitrite: x   Leuk Esterase: x / RBC: x / WBC x   Sq Epi: x / Non Sq Epi: x / Bacteria: x      CAPILLARY BLOOD GLUCOSE            Culture - Sputum (collected 08-21-24 @ 12:20)  Source: .Sputum Sputum  Gram Stain (08-21-24 @ 22:35):    Few polymorphonuclear leukocytes per low power field    No Squamous epithelial cells per low power field    Few Gram positive cocci in pairs per oil power field    Rare Gram Negative Rods per oil power field        RADIOLOGY & ADDITIONAL TESTS:    Personally reviewed.     Consultant(s) Notes Reviewed:  [x] YES  [ ] NO

## 2024-08-22 NOTE — PROGRESS NOTE ADULT - SUBJECTIVE AND OBJECTIVE BOX
[facility]  INFECTIOUS DISEASES   [address] [place] [address]  Tel: [phone]     Fax: [phone]  ======================================================  MD Win Mendes Kaushal, MD Cho, Michelle, MD   ======================================================    Assessment/Recommendations:     63-year-old male with history of coronary artery disease s/p CABG dyslipidemia presents with cough chills fever that started 3 days prior to arrival after having sick contact with one of his friend 2 days prior to that who tested positive for COVID-19 and patient took 1 dose of Paxlovid on the day of arrival to the ED for severe weakness and progressive diarrhea without abdominal pain being admitted for    Acute Hypoxic respiratory failure  COVID-19 infection  B/L Viral Pneumonia     Patient Seen and examined   WBC improved, afebrile, hemodynamically stable  Trend WBC and temperature curve through hospital course  Cultures: Could not obtain due to improvement in the symptoms    Imaging:    CT Angio Chest PE Protocol w/ IV Cont (08.20.24 ): No acute pulmonary embolism. Dilated aortic root measuring 4.3 cm. Small bilateral pleural effusions with partial atelectasis lower lobes.     - Remdesivir treatment: S/p 200 mg IV LD on 8.19 AND  MG iv DAILY (Today day 4/5)   - Dexamethasone 6mg PO Daily x 10 days Starting from August 19  Continue with anticoagulation for DVT prophylaxis  Incentive spirometry     CT chest noted as above with overall clinical improvement, we will hold off on other antibiotics for now   trend procalcitonin  Management of respiratory failure as per primary team/pulmonary    Plan explained to patient   D/w Primary team     ________________________________    Last 24 hours:   Overall symptoms of diarrhea productive cough significantly improved  Improved supplemental O2 requirement as of  Ambulating & hydrating well    Further ROS:  All systems reviewed. No other complaints besides mentioned above     ______________________________  Allergies    No Known Allergies    MEDICATIONS  (STANDING):  aspirin  chewable 81 milliGRAM(s) Oral daily  benzocaine/menthol Lozenge 1 Lozenge Oral three times a day  benzonatate 100 milliGRAM(s) Oral three times a day  dexAMETHasone     Tablet 6 milliGRAM(s) Oral daily  enoxaparin Injectable 40 milliGRAM(s) SubCutaneous every 24 hours  ezetimibe 5 milliGRAM(s) Oral daily  famotidine    Tablet 20 milliGRAM(s) Oral daily  guaiFENesin  milliGRAM(s) Oral every 12 hours  remdesivir  IVPB   IV Intermittent   remdesivir  IVPB 100 milliGRAM(s) IV Intermittent every 24 hours  rosuvastatin 20 milliGRAM(s) Oral at bedtime  senna 2 Tablet(s) Oral at bedtime  sodium chloride 0.9%. 1000 milliLiter(s) (75 mL/Hr) IV Continuous <Continuous>    MEDICATIONS  (PRN):  acetaminophen     Tablet .. 650 milliGRAM(s) Oral every 6 hours PRN Temp greater or equal to 38C (100.4F), Mild Pain (1 - 3)  melatonin 3 milliGRAM(s) Oral at bedtime PRN Insomnia  polyethylene glycol 3350 17 Gram(s) Oral daily PRN Constipation      _________________    PHYSICAL EXAM:    Objective:  Vital Signs Last 24 Hrs  T(C): 36.5 (22 Aug 2024 06:18), Max: 36.7 (21 Aug 2024 20:33)  T(F): 97.7 (22 Aug 2024 06:18), Max: 98.1 (21 Aug 2024 20:33)  HR: 58 (22 Aug 2024 06:18) (58 - 58)  BP: 126/73 (22 Aug 2024 06:18) (113/66 - 126/73)  RR: 18 (22 Aug 2024 06:18) (18 - 21)  SpO2: 92% (22 Aug 2024 06:18) (92% - 95%)  Parameters below as of 22 Aug 2024 06:18  Patient On (Oxygen Delivery Method): nasal cannula  O2 Flow (L/min): 1  General: No acute distress. Lying in bed comfortably   Neuro: AAO*3, No motor, sensory, or cranial nerve deficit  HEENT: Pupils equal, reactive to light, Oral mucosa moist  PULM: Clear to auscultation bilaterally, no significant adventitious breath sounds   CVS: Regular rhythm and controlled rate, no murmurs, rubs, or gallops  ABD: Soft, nondistended, nontender, normoactive bowel sounds, no CVA tenderness  EXT: No b/l LE edema, nontender with pedal pulse palpable   SKIN: Warm and well perfused, no acute rashes   ______________  LABS, MICROBIOLOGY, RADIOLOGY & ADDITIONAL STUDIES: Reviewed

## 2024-08-22 NOTE — PROGRESS NOTE ADULT - PROBLEM SELECTOR PLAN 1
- Pt COVID+ 3 days ago, started on paxlovid, presenting with fevers, chills, cough, hypoxia  - Remdesivir x 5 days  - Guaifenesin 600mg BID  - Dexamethasone 6 mg x 10 days  - started tessalon perles  - Not on home O2; currently on 1L NC, will continue to titrate him down  - CTA chest: No PE, small bilateral pleural effusions with partial atelectasis lower lobes.  - ID and Pulmonary f/u  - Leukocytosis this AM, likely reactive, will keep monitoring daily CBCs

## 2024-08-23 LAB
ALBUMIN SERPL ELPH-MCNC: 2.5 G/DL — LOW (ref 3.3–5)
ALBUMIN SERPL ELPH-MCNC: 2.5 G/DL — LOW (ref 3.3–5)
ALP SERPL-CCNC: 47 U/L — SIGNIFICANT CHANGE UP (ref 40–120)
ALP SERPL-CCNC: 49 U/L — SIGNIFICANT CHANGE UP (ref 40–120)
ALT FLD-CCNC: 59 U/L — SIGNIFICANT CHANGE UP (ref 12–78)
ALT FLD-CCNC: 61 U/L — SIGNIFICANT CHANGE UP (ref 12–78)
ANION GAP SERPL CALC-SCNC: 3 MMOL/L — LOW (ref 5–17)
AST SERPL-CCNC: 37 U/L — SIGNIFICANT CHANGE UP (ref 15–37)
AST SERPL-CCNC: 39 U/L — HIGH (ref 15–37)
BASOPHILS # BLD AUTO: 0.01 K/UL — SIGNIFICANT CHANGE UP (ref 0–0.2)
BASOPHILS NFR BLD AUTO: 0.1 % — SIGNIFICANT CHANGE UP (ref 0–2)
BILIRUB DIRECT SERPL-MCNC: 0.1 MG/DL — SIGNIFICANT CHANGE UP (ref 0–0.3)
BILIRUB INDIRECT FLD-MCNC: 0.3 MG/DL — SIGNIFICANT CHANGE UP (ref 0.2–1)
BILIRUB SERPL-MCNC: 0.4 MG/DL — SIGNIFICANT CHANGE UP (ref 0.2–1.2)
BILIRUB SERPL-MCNC: 0.4 MG/DL — SIGNIFICANT CHANGE UP (ref 0.2–1.2)
BUN SERPL-MCNC: 26 MG/DL — HIGH (ref 7–23)
CALCIUM SERPL-MCNC: 8.9 MG/DL — SIGNIFICANT CHANGE UP (ref 8.5–10.1)
CHLORIDE SERPL-SCNC: 104 MMOL/L — SIGNIFICANT CHANGE UP (ref 96–108)
CO2 SERPL-SCNC: 35 MMOL/L — HIGH (ref 22–31)
CREAT SERPL-MCNC: 0.8 MG/DL — SIGNIFICANT CHANGE UP (ref 0.5–1.3)
EGFR: 98 ML/MIN/1.73M2 — SIGNIFICANT CHANGE UP
EOSINOPHIL # BLD AUTO: 0 K/UL — SIGNIFICANT CHANGE UP (ref 0–0.5)
EOSINOPHIL NFR BLD AUTO: 0 % — SIGNIFICANT CHANGE UP (ref 0–6)
GLUCOSE SERPL-MCNC: 128 MG/DL — HIGH (ref 70–99)
HCT VFR BLD CALC: 44.7 % — SIGNIFICANT CHANGE UP (ref 39–50)
HGB BLD-MCNC: 15.1 G/DL — SIGNIFICANT CHANGE UP (ref 13–17)
IMM GRANULOCYTES NFR BLD AUTO: 0.4 % — SIGNIFICANT CHANGE UP (ref 0–0.9)
INR BLD: 1.22 RATIO — HIGH (ref 0.85–1.18)
LYMPHOCYTES # BLD AUTO: 0.92 K/UL — LOW (ref 1–3.3)
LYMPHOCYTES # BLD AUTO: 8.2 % — LOW (ref 13–44)
MAGNESIUM SERPL-MCNC: 2.6 MG/DL — SIGNIFICANT CHANGE UP (ref 1.6–2.6)
MCHC RBC-ENTMCNC: 30.6 PG — SIGNIFICANT CHANGE UP (ref 27–34)
MCHC RBC-ENTMCNC: 33.8 GM/DL — SIGNIFICANT CHANGE UP (ref 32–36)
MCV RBC AUTO: 90.5 FL — SIGNIFICANT CHANGE UP (ref 80–100)
MONOCYTES # BLD AUTO: 0.61 K/UL — SIGNIFICANT CHANGE UP (ref 0–0.9)
MONOCYTES NFR BLD AUTO: 5.4 % — SIGNIFICANT CHANGE UP (ref 2–14)
NEUTROPHILS # BLD AUTO: 9.65 K/UL — HIGH (ref 1.8–7.4)
NEUTROPHILS NFR BLD AUTO: 85.9 % — HIGH (ref 43–77)
NRBC # BLD: 0 /100 WBCS — SIGNIFICANT CHANGE UP (ref 0–0)
PHOSPHATE SERPL-MCNC: 4.2 MG/DL — SIGNIFICANT CHANGE UP (ref 2.5–4.5)
PLATELET # BLD AUTO: 189 K/UL — SIGNIFICANT CHANGE UP (ref 150–400)
POTASSIUM SERPL-MCNC: 4.1 MMOL/L — SIGNIFICANT CHANGE UP (ref 3.5–5.3)
POTASSIUM SERPL-SCNC: 4.1 MMOL/L — SIGNIFICANT CHANGE UP (ref 3.5–5.3)
PROT SERPL-MCNC: 6.1 G/DL — SIGNIFICANT CHANGE UP (ref 6–8.3)
PROT SERPL-MCNC: 6.1 G/DL — SIGNIFICANT CHANGE UP (ref 6–8.3)
PROTHROM AB SERPL-ACNC: 13.8 SEC — HIGH (ref 9.5–13)
RBC # BLD: 4.94 M/UL — SIGNIFICANT CHANGE UP (ref 4.2–5.8)
RBC # FLD: 12.2 % — SIGNIFICANT CHANGE UP (ref 10.3–14.5)
SODIUM SERPL-SCNC: 142 MMOL/L — SIGNIFICANT CHANGE UP (ref 135–145)
WBC # BLD: 11.24 K/UL — HIGH (ref 3.8–10.5)
WBC # FLD AUTO: 11.24 K/UL — HIGH (ref 3.8–10.5)

## 2024-08-23 PROCEDURE — 99233 SBSQ HOSP IP/OBS HIGH 50: CPT

## 2024-08-23 PROCEDURE — 99232 SBSQ HOSP IP/OBS MODERATE 35: CPT | Mod: GC

## 2024-08-23 RX ADMIN — GUAIFENESIN 600 MILLIGRAM(S): 100 LIQUID ORAL at 06:15

## 2024-08-23 RX ADMIN — EZETIMIBE 5 MILLIGRAM(S): 10 TABLET ORAL at 13:10

## 2024-08-23 RX ADMIN — FAMOTIDINE 20 MILLIGRAM(S): 10 INJECTION INTRAVENOUS at 13:10

## 2024-08-23 RX ADMIN — REMDESIVIR 200 MILLIGRAM(S): 5 INJECTION INTRAVENOUS at 17:28

## 2024-08-23 RX ADMIN — Medication 81 MILLIGRAM(S): at 13:10

## 2024-08-23 RX ADMIN — GUAIFENESIN 600 MILLIGRAM(S): 100 LIQUID ORAL at 17:29

## 2024-08-23 RX ADMIN — ROSUVASTATIN CALCIUM 20 MILLIGRAM(S): 10 TABLET ORAL at 22:03

## 2024-08-23 RX ADMIN — ENOXAPARIN SODIUM 40 MILLIGRAM(S): 100 INJECTION SUBCUTANEOUS at 22:03

## 2024-08-23 RX ADMIN — Medication 100 MILLIGRAM(S): at 06:15

## 2024-08-23 RX ADMIN — Medication 6 MILLIGRAM(S): at 06:15

## 2024-08-23 RX ADMIN — Medication 100 MILLIGRAM(S): at 13:10

## 2024-08-23 RX ADMIN — Medication 100 MILLIGRAM(S): at 22:03

## 2024-08-23 NOTE — PROGRESS NOTE ADULT - PROBLEM SELECTOR PLAN 1
- Pt COVID+ 3 days ago, started on paxlovid, presenting with fevers, chills, cough, hypoxia  - Remdesivir x 5 days  - Guaifenesin 600mg BID  - Dexamethasone 6 mg x 10 days  - started tessalon perles  - Not on home O2; currently on 1L NC, will continue to titrate him down  - CTA chest: No PE, small bilateral pleural effusions with partial atelectasis lower lobes.  - ID and Pulmonary f/u  - Leukocytosis this AM, likely reactive, will keep monitoring daily CBCs  - Home O2 eval completed; SW to set up home O2 for pt - Pt COVID+ 3 days ago, started on paxlovid, presenting with fevers, chills, cough, hypoxia  - Remdesivir x 5 days  - Guaifenesin 600mg BID  - Dexamethasone 6 mg x 10 days  - continue tessalon perles  - Not on home O2; currently on 1L NC, will continue to titrate him down  - CTA chest: No PE, small bilateral pleural effusions with partial atelectasis lower lobes.  - ID and Pulmonary f/u  - Leukocytosis this AM, likely reactive, will keep monitoring daily CBCs  - Home O2 eval completed; does not meet requirements for home O2

## 2024-08-23 NOTE — CASE MANAGEMENT PROGRESS NOTE - NSCMPROGRESSNOTE_GEN_ALL_CORE
Discussed pt in rounds. Pt receiving oxygen 1L. Home oxygen evaluation done, o2 sat 89 % at rest and 90% on exertion. Pt does not qualify for home oxygen at this time. Per MD plan for possible transition home tomorrow

## 2024-08-23 NOTE — PROGRESS NOTE ADULT - TIME BILLING
Please call us with any concerns or questions.   We will continue to follow.     Irving Walden MD   Division of Infectious Diseases  [facility]   Cell [phone] between 8am and 6pm   After 6pm and weekends please call ID service at [phone].
Please call us with any concerns or questions.   We will continue to follow.     Irving Walden MD   Division of Infectious Diseases  [facility]   Cell [phone] between 8am and 6pm   After 6pm and weekends please call ID service at [phone].
Plan d/w patient and primary team   We will sign off for now.   Please call us with any concerns or questions.     Irving Walden MD   Division of Infectious Diseases  [facility]   Cell [phone] between 8am and 6pm   After 6pm and weekends please call ID service at [phone].

## 2024-08-23 NOTE — PROGRESS NOTE ADULT - SUBJECTIVE AND OBJECTIVE BOX
Patient is a 63y old  Male who presents with a chief complaint of COVID+ with associated hypoxia (22 Aug 2024 10:23)      INTERVAL HPI/OVERNIGHT EVENTS: Patient seen and examined at bedside. No overnight events occurred. Patient states that his cough is improving but endorses some anxiety regarding his fluctuating oxygen saturations. Denies fevers, chills, headache, lightheadedness, chest pain, dyspnea, abdominal pain, n/v/d/c.    MEDICATIONS  (STANDING):  aspirin  chewable 81 milliGRAM(s) Oral daily  benzocaine/menthol Lozenge 1 Lozenge Oral three times a day  benzonatate 100 milliGRAM(s) Oral three times a day  dexAMETHasone     Tablet 6 milliGRAM(s) Oral daily  enoxaparin Injectable 40 milliGRAM(s) SubCutaneous every 24 hours  ezetimibe 5 milliGRAM(s) Oral daily  famotidine    Tablet 20 milliGRAM(s) Oral daily  guaiFENesin  milliGRAM(s) Oral every 12 hours  remdesivir  IVPB 100 milliGRAM(s) IV Intermittent every 24 hours  remdesivir  IVPB   IV Intermittent   rosuvastatin 20 milliGRAM(s) Oral at bedtime  senna 2 Tablet(s) Oral at bedtime  sodium chloride 0.9%. 1000 milliLiter(s) (75 mL/Hr) IV Continuous <Continuous>    MEDICATIONS  (PRN):  acetaminophen     Tablet .. 650 milliGRAM(s) Oral every 6 hours PRN Temp greater or equal to 38C (100.4F), Mild Pain (1 - 3)  melatonin 3 milliGRAM(s) Oral at bedtime PRN Insomnia  polyethylene glycol 3350 17 Gram(s) Oral daily PRN Constipation      Allergies    No Known Allergies    Intolerances        REVIEW OF SYSTEMS:  CONSTITUTIONAL: No fever or chills  HEENT:  No headache, no sore throat  RESPIRATORY: (+) cough, no wheezing, or shortness of breath  CARDIOVASCULAR: No chest pain, palpitations  GASTROINTESTINAL: No abd pain, nausea, vomiting, or diarrhea  GENITOURINARY: No dysuria, frequency, or hematuria  NEUROLOGICAL: no focal weakness or dizziness  MUSCULOSKELETAL: no myalgias     Vital Signs Last 24 Hrs  T(C): 36.5 (23 Aug 2024 06:10), Max: 36.5 (23 Aug 2024 06:10)  T(F): 97.7 (23 Aug 2024 06:10), Max: 97.7 (23 Aug 2024 06:10)  HR: 59 (23 Aug 2024 06:10) (59 - 69)  BP: 117/69 (23 Aug 2024 06:10) (111/67 - 117/69)  BP(mean): --  RR: 20 (23 Aug 2024 06:10) (20 - 23)  SpO2: 93% (23 Aug 2024 06:10) (88% - 95%)    Parameters below as of 23 Aug 2024 06:10  Patient On (Oxygen Delivery Method): nasal cannula  O2 Flow (L/min): 1      PHYSICAL EXAM:  GENERAL: NAD  HEENT:  anicteric, moist mucous membranes  CHEST/LUNG:  diminished BS b/l, no rales, wheezes, or rhonchi  HEART:  RRR, S1, S2  ABDOMEN:  BS+, soft, nontender, nondistended  EXTREMITIES: no edema, cyanosis, or calf tenderness  NERVOUS SYSTEM: answers questions and follows commands appropriately    LABS:                        15.1   11.24 )-----------( 189      ( 23 Aug 2024 07:11 )             44.7     CBC Full  -  ( 23 Aug 2024 07:11 )  WBC Count : 11.24 K/uL  Hemoglobin : 15.1 g/dL  Hematocrit : 44.7 %  Platelet Count - Automated : 189 K/uL  Mean Cell Volume : 90.5 fl  Mean Cell Hemoglobin : 30.6 pg  Mean Cell Hemoglobin Concentration : 33.8 gm/dL  Auto Neutrophil # : 9.65 K/uL  Auto Lymphocyte # : 0.92 K/uL  Auto Monocyte # : 0.61 K/uL  Auto Eosinophil # : 0.00 K/uL  Auto Basophil # : 0.01 K/uL  Auto Neutrophil % : 85.9 %  Auto Lymphocyte % : 8.2 %  Auto Monocyte % : 5.4 %  Auto Eosinophil % : 0.0 %  Auto Basophil % : 0.1 %    23 Aug 2024 07:11    142    |  104    |  26     ----------------------------<  128    4.1     |  35     |  0.80     Ca    8.9        23 Aug 2024 07:11  Phos  4.2       23 Aug 2024 07:11  Mg     2.6       23 Aug 2024 07:11    TPro  6.1    /  Alb  2.5    /  TBili  0.4    /  DBili  0.1    /  AST  37     /  ALT  61     /  AlkPhos  47     23 Aug 2024 07:11    PT/INR - ( 23 Aug 2024 07:11 )   PT: 13.8 sec;   INR: 1.22 ratio           Urinalysis Basic - ( 23 Aug 2024 07:11 )    Color: x / Appearance: x / SG: x / pH: x  Gluc: 128 mg/dL / Ketone: x  / Bili: x / Urobili: x   Blood: x / Protein: x / Nitrite: x   Leuk Esterase: x / RBC: x / WBC x   Sq Epi: x / Non Sq Epi: x / Bacteria: x      CAPILLARY BLOOD GLUCOSE      Culture - Sputum (collected 08-21-24 @ 12:20)  Source: .Sputum Sputum  Gram Stain (08-21-24 @ 22:35):    Few polymorphonuclear leukocytes per low power field    No Squamous epithelial cells per low power field    Few Gram positive cocci in pairs per oil power field    Rare Gram Negative Rods per oil power field  Preliminary Report (08-22-24 @ 19:55):    Normal Respiratory Malissa present        RADIOLOGY & ADDITIONAL TESTS:    Personally reviewed.     Consultant(s) Notes Reviewed:  [x] YES  [ ] NO

## 2024-08-23 NOTE — PROGRESS NOTE ADULT - SUBJECTIVE AND OBJECTIVE BOX
[facility]  INFECTIOUS DISEASES  [address] [place] [address]  Tel: [phone]     Fax: [phone]  ======================================================  MD Win Mendes Kaushal, MD Cho, Michelle, MD  ======================================================    Assessment/Recommendations:    63-year-old male with history of coronary artery disease s/p CABG dyslipidemia presents with cough chills fever that started 3 days prior to arrival after having sick contact with one of his friend 2 days prior to that who tested positive for COVID-19 and patient took 1 dose of Paxlovid on the day of arrival to the ED for severe weakness and progressive diarrhea without abdominal pain being admitted for    Acute Hypoxic respiratory failure  COVID-19 infection  B/L Viral Pneumonia    Patient Seen and examined  WBC stable, afebrile, hemodynamically stable  Trend WBC and temperature curve through hospital course  Cultures: Sputum Cx: Respiratory peng    Imaging:   CT Angio Chest PE Protocol w/ IV Cont (08.20.24 ): No acute pulmonary embolism. Dilated aortic root measuring 4.3 cm. Small bilateral pleural effusions with partial atelectasis lower lobes.    - Remdesivir treatment: S/p 200 mg IV LD on 8.19 AND  MG iv DAILY (Today day 5/5) : D/c after today  - Dexamethasone 6mg PO Daily x 10 days Starting from August 19 (8.28  last date)  Continue with anticoagulation for DVT prophylaxis  Incentive spirometry     CT chest noted as above with overall clinical improvement, we will hold off on other antibiotics for now   normalized procalcitonin  Management of respiratory failure as per primary team/pulmonary  No needs for Abx/ at risk for secondary bacterial PNA    Plan explained to patient  D/w Primary team    ________________________________    Last 24 hours:  Overall symptoms of diarrhea productive cough significantly improved  Improved supplemental O2 requirement but remained on NC  Ambulating & hydrating well    Further ROS:  All systems reviewed. No other complaints besides mentioned above    ______________________________  Allergies    No Known Allergies    MEDICATIONS  (STANDING):  aspirin  chewable 81 milliGRAM(s) Oral daily  benzocaine/menthol Lozenge 1 Lozenge Oral three times a day  benzonatate 100 milliGRAM(s) Oral three times a day  dexAMETHasone     Tablet 6 milliGRAM(s) Oral daily  enoxaparin Injectable 40 milliGRAM(s) SubCutaneous every 24 hours  ezetimibe 5 milliGRAM(s) Oral daily  famotidine    Tablet 20 milliGRAM(s) Oral daily  guaiFENesin  milliGRAM(s) Oral every 12 hours  remdesivir  IVPB   IV Intermittent  remdesivir  IVPB 100 milliGRAM(s) IV Intermittent every 24 hours  rosuvastatin 20 milliGRAM(s) Oral at bedtime  senna 2 Tablet(s) Oral at bedtime  sodium chloride 0.9%. 1000 milliLiter(s) (75 mL/Hr) IV Continuous <Continuous>    MEDICATIONS  (PRN):  acetaminophen     Tablet .. 650 milliGRAM(s) Oral every 6 hours PRN Temp greater or equal to 38C (100.4F), Mild Pain (1 - 3)  melatonin 3 milliGRAM(s) Oral at bedtime PRN Insomnia  polyethylene glycol 3350 17 Gram(s) Oral daily PRN Constipation        _________________    PHYSICAL EXAM:    Vital Signs Last 24 Hrs  T(C): 36.5 (23 Aug 2024 06:10), Max: 36.5 (23 Aug 2024 06:10)  T(F): 97.7 (23 Aug 2024 06:10), Max: 97.7 (23 Aug 2024 06:10)  HR: 59 (23 Aug 2024 06:10) (59 - 69)  BP: 117/69 (23 Aug 2024 06:10) (111/67 - 117/69)  RR: 20 (23 Aug 2024 06:10) (20 - 23)  SpO2: 93% (23 Aug 2024 06:10) (88% - 95%)    Parameters below as of 23 Aug 2024 06:10  Patient On (Oxygen Delivery Method): nasal cannula  O2 Flow (L/min): 1      General: No acute distress. Lying in bed comfortably  Neuro: AAO*3, No motor, sensory, or cranial nerve deficit  HEENT: Pupils equal, reactive to light, Oral mucosa moist  PULM: Clear to auscultation bilaterally, no significant adventitious breath sounds  CVS: Regular rhythm and controlled rate, no murmurs, rubs, or gallops  ABD: Soft, nondistended, nontender, normoactive bowel sounds, no CVA tenderness  EXT: No b/l LE edema, nontender with pedal pulse palpable  SKIN: Warm and well perfused, no acute rashes  ______________  LABS, MICROBIOLOGY, RADIOLOGY & ADDITIONAL STUDIES: Reviewed

## 2024-08-23 NOTE — CHART NOTE - NSCHARTNOTEFT_GEN_A_CORE
spoke with pt  discussed disease course - recovery and prognosis  fio2 titration and clinical improvement  pt is reporting improvement from time of admission and remains optimistic - however - anxious -   dc planning in progress  fio2 titration in progress  discussed above with PMD - Dr Holm

## 2024-08-23 NOTE — PHYSICAL THERAPY INITIAL EVALUATION ADULT - PERTINENT HX OF CURRENT PROBLEM, REHAB EVAL
63-year-old male with history of coronary artery disease s/p CABG dyslipidemia presents with cough chills fever that started 3 days prior to arrival after having sick contact with one of his friend 2 days prior to that who tested positive for COVID-19 and patient took 1 dose of Paxlovid on the day of arrival to the ED for severe weakness and progressive diarrhea without abdominal pain being admitted for Acute Hypoxic respiratory failure COVID-19 infection .B/L Viral Pneumonia

## 2024-08-24 LAB
ALBUMIN SERPL ELPH-MCNC: 2.7 G/DL — LOW (ref 3.3–5)
ALP SERPL-CCNC: 58 U/L — SIGNIFICANT CHANGE UP (ref 40–120)
ALT FLD-CCNC: 94 U/L — HIGH (ref 12–78)
ANION GAP SERPL CALC-SCNC: 7 MMOL/L — SIGNIFICANT CHANGE UP (ref 5–17)
AST SERPL-CCNC: 49 U/L — HIGH (ref 15–37)
BILIRUB DIRECT SERPL-MCNC: 0.1 MG/DL — SIGNIFICANT CHANGE UP (ref 0–0.3)
BILIRUB INDIRECT FLD-MCNC: 0.4 MG/DL — SIGNIFICANT CHANGE UP (ref 0.2–1)
BILIRUB SERPL-MCNC: 0.5 MG/DL — SIGNIFICANT CHANGE UP (ref 0.2–1.2)
BUN SERPL-MCNC: 25 MG/DL — HIGH (ref 7–23)
CALCIUM SERPL-MCNC: 9 MG/DL — SIGNIFICANT CHANGE UP (ref 8.5–10.1)
CHLORIDE SERPL-SCNC: 101 MMOL/L — SIGNIFICANT CHANGE UP (ref 96–108)
CO2 SERPL-SCNC: 31 MMOL/L — SIGNIFICANT CHANGE UP (ref 22–31)
CREAT SERPL-MCNC: 1 MG/DL — SIGNIFICANT CHANGE UP (ref 0.5–1.3)
EGFR: 84 ML/MIN/1.73M2 — SIGNIFICANT CHANGE UP
GLUCOSE SERPL-MCNC: 231 MG/DL — HIGH (ref 70–99)
HCT VFR BLD CALC: 47.3 % — SIGNIFICANT CHANGE UP (ref 39–50)
HGB BLD-MCNC: 16.2 G/DL — SIGNIFICANT CHANGE UP (ref 13–17)
INR BLD: 1.29 RATIO — HIGH (ref 0.85–1.18)
MAGNESIUM SERPL-MCNC: 2.5 MG/DL — SIGNIFICANT CHANGE UP (ref 1.6–2.6)
MCHC RBC-ENTMCNC: 30.6 PG — SIGNIFICANT CHANGE UP (ref 27–34)
MCHC RBC-ENTMCNC: 34.2 GM/DL — SIGNIFICANT CHANGE UP (ref 32–36)
MCV RBC AUTO: 89.4 FL — SIGNIFICANT CHANGE UP (ref 80–100)
NRBC # BLD: 0 /100 WBCS — SIGNIFICANT CHANGE UP (ref 0–0)
PHOSPHATE SERPL-MCNC: 3.4 MG/DL — SIGNIFICANT CHANGE UP (ref 2.5–4.5)
PLATELET # BLD AUTO: 223 K/UL — SIGNIFICANT CHANGE UP (ref 150–400)
POTASSIUM SERPL-MCNC: 4.1 MMOL/L — SIGNIFICANT CHANGE UP (ref 3.5–5.3)
POTASSIUM SERPL-SCNC: 4.1 MMOL/L — SIGNIFICANT CHANGE UP (ref 3.5–5.3)
PROT SERPL-MCNC: 6.5 G/DL — SIGNIFICANT CHANGE UP (ref 6–8.3)
PROTHROM AB SERPL-ACNC: 14.6 SEC — HIGH (ref 9.5–13)
RBC # BLD: 5.29 M/UL — SIGNIFICANT CHANGE UP (ref 4.2–5.8)
RBC # FLD: 12 % — SIGNIFICANT CHANGE UP (ref 10.3–14.5)
SODIUM SERPL-SCNC: 139 MMOL/L — SIGNIFICANT CHANGE UP (ref 135–145)
WBC # BLD: 14.55 K/UL — HIGH (ref 3.8–10.5)
WBC # FLD AUTO: 14.55 K/UL — HIGH (ref 3.8–10.5)

## 2024-08-24 PROCEDURE — 99232 SBSQ HOSP IP/OBS MODERATE 35: CPT | Mod: GC

## 2024-08-24 RX ORDER — ASPIRIN 81 MG
1 TABLET, DELAYED RELEASE (ENTERIC COATED) ORAL
Qty: 0 | Refills: 0 | DISCHARGE
Start: 2024-08-24

## 2024-08-24 RX ORDER — EZETIMIBE 10 MG/1
0.5 TABLET ORAL
Qty: 0 | Refills: 0 | DISCHARGE
Start: 2024-08-24

## 2024-08-24 RX ORDER — ROSUVASTATIN CALCIUM 10 MG/1
1 TABLET ORAL
Qty: 0 | Refills: 0 | DISCHARGE
Start: 2024-08-24

## 2024-08-24 RX ORDER — BENZONATATE 100 MG
1 CAPSULE ORAL
Qty: 21 | Refills: 0
Start: 2024-08-24 | End: 2024-08-30

## 2024-08-24 RX ORDER — GUAIFENESIN 100 MG/5ML
1 LIQUID ORAL
Qty: 0 | Refills: 0 | DISCHARGE
Start: 2024-08-24

## 2024-08-24 RX ORDER — EZETIMIBE 10 MG/1
0.5 TABLET ORAL
Refills: 0 | DISCHARGE

## 2024-08-24 RX ADMIN — Medication 100 MILLIGRAM(S): at 21:37

## 2024-08-24 RX ADMIN — Medication 2 TABLET(S): at 21:37

## 2024-08-24 RX ADMIN — EZETIMIBE 5 MILLIGRAM(S): 10 TABLET ORAL at 11:39

## 2024-08-24 RX ADMIN — Medication 100 MILLIGRAM(S): at 17:14

## 2024-08-24 RX ADMIN — Medication 81 MILLIGRAM(S): at 11:40

## 2024-08-24 RX ADMIN — ENOXAPARIN SODIUM 40 MILLIGRAM(S): 100 INJECTION SUBCUTANEOUS at 21:37

## 2024-08-24 RX ADMIN — Medication 100 MILLIGRAM(S): at 06:21

## 2024-08-24 RX ADMIN — Medication 6 MILLIGRAM(S): at 06:21

## 2024-08-24 RX ADMIN — FAMOTIDINE 20 MILLIGRAM(S): 10 INJECTION INTRAVENOUS at 11:40

## 2024-08-24 RX ADMIN — GUAIFENESIN 600 MILLIGRAM(S): 100 LIQUID ORAL at 06:21

## 2024-08-24 RX ADMIN — GUAIFENESIN 600 MILLIGRAM(S): 100 LIQUID ORAL at 17:14

## 2024-08-24 RX ADMIN — ROSUVASTATIN CALCIUM 20 MILLIGRAM(S): 10 TABLET ORAL at 21:39

## 2024-08-24 NOTE — PROGRESS NOTE ADULT - REASON FOR ADMISSION
COVID+ with associated hypoxia

## 2024-08-24 NOTE — PROGRESS NOTE ADULT - PROBLEM SELECTOR PLAN 4
- DVT ppx lovenox 40 mg subQ QD    Dispo: pt to leave 8/25 AM pending ride from wife, medically stable for d/c
- DVT ppx lovenox 40 mg subQ QD

## 2024-08-24 NOTE — PROGRESS NOTE ADULT - SUBJECTIVE AND OBJECTIVE BOX
Patient is a 63y old  Male who presents with a chief complaint of COVID+ with associated hypoxia (23 Aug 2024 12:49)      INTERVAL HPI/OVERNIGHT EVENTS: Patient seen and examined at bedside. No overnight events occurred. Patient has no complaints at this time. Denies fevers, chills, headache, lightheadedness, chest pain, dyspnea, abdominal pain, n/v/d/c.    MEDICATIONS  (STANDING):  aspirin  chewable 81 milliGRAM(s) Oral daily  benzocaine/menthol Lozenge 1 Lozenge Oral three times a day  benzonatate 100 milliGRAM(s) Oral three times a day  dexAMETHasone     Tablet 6 milliGRAM(s) Oral daily  enoxaparin Injectable 40 milliGRAM(s) SubCutaneous every 24 hours  ezetimibe 5 milliGRAM(s) Oral daily  famotidine    Tablet 20 milliGRAM(s) Oral daily  guaiFENesin  milliGRAM(s) Oral every 12 hours  rosuvastatin 20 milliGRAM(s) Oral at bedtime  senna 2 Tablet(s) Oral at bedtime    MEDICATIONS  (PRN):  acetaminophen     Tablet .. 650 milliGRAM(s) Oral every 6 hours PRN Temp greater or equal to 38C (100.4F), Mild Pain (1 - 3)  melatonin 3 milliGRAM(s) Oral at bedtime PRN Insomnia  polyethylene glycol 3350 17 Gram(s) Oral daily PRN Constipation      Allergies    No Known Allergies    Intolerances        REVIEW OF SYSTEMS:  CONSTITUTIONAL: No fever or chills  HEENT:  No headache, no sore throat  RESPIRATORY: No cough, wheezing, or shortness of breath  CARDIOVASCULAR: No chest pain, palpitations  GASTROINTESTINAL: No abd pain, nausea, vomiting, or diarrhea  GENITOURINARY: No dysuria, frequency, or hematuria  NEUROLOGICAL: no focal weakness or dizziness  MUSCULOSKELETAL: no myalgias     Vital Signs Last 24 Hrs  T(C): 36.4 (24 Aug 2024 11:17), Max: 36.4 (23 Aug 2024 13:00)  T(F): 97.6 (24 Aug 2024 11:17), Max: 97.6 (23 Aug 2024 20:28)  HR: 69 (24 Aug 2024 11:17) (55 - 69)  BP: 120/73 (24 Aug 2024 11:17) (110/61 - 132/75)  BP(mean): --  RR: 17 (24 Aug 2024 11:17) (16 - 20)  SpO2: 93% (24 Aug 2024 11:17) (89% - 93%)    Parameters below as of 24 Aug 2024 11:17  Patient On (Oxygen Delivery Method): room air        PHYSICAL EXAM:  GENERAL: NAD  HEENT:  anicteric, moist mucous membranes  CHEST/LUNG:  diminished BS b/l, no rales, wheezes, or rhonchi  HEART:  RRR, S1, S2  ABDOMEN:  BS+, soft, nontender, nondistended  EXTREMITIES: no edema, cyanosis, or calf tenderness  NERVOUS SYSTEM: answers questions and follows commands appropriately    LABS:                        16.2   14.55 )-----------( 223      ( 24 Aug 2024 09:55 )             47.3     CBC Full  -  ( 24 Aug 2024 09:55 )  WBC Count : 14.55 K/uL  Hemoglobin : 16.2 g/dL  Hematocrit : 47.3 %  Platelet Count - Automated : 223 K/uL  Mean Cell Volume : 89.4 fl  Mean Cell Hemoglobin : 30.6 pg  Mean Cell Hemoglobin Concentration : 34.2 gm/dL  Auto Neutrophil # : x  Auto Lymphocyte # : x  Auto Monocyte # : x  Auto Eosinophil # : x  Auto Basophil # : x  Auto Neutrophil % : x  Auto Lymphocyte % : x  Auto Monocyte % : x  Auto Eosinophil % : x  Auto Basophil % : x    24 Aug 2024 09:55    139    |  101    |  25     ----------------------------<  231    4.1     |  31     |  1.00     Ca    9.0        24 Aug 2024 09:55  Phos  3.4       24 Aug 2024 09:55  Mg     2.5       24 Aug 2024 09:55    TPro  6.5    /  Alb  2.7    /  TBili  0.5    /  DBili  0.1    /  AST  49     /  ALT  94     /  AlkPhos  58     24 Aug 2024 09:55    PT/INR - ( 24 Aug 2024 09:55 )   PT: 14.6 sec;   INR: 1.29 ratio           Urinalysis Basic - ( 24 Aug 2024 09:55 )    Color: x / Appearance: x / SG: x / pH: x  Gluc: 231 mg/dL / Ketone: x  / Bili: x / Urobili: x   Blood: x / Protein: x / Nitrite: x   Leuk Esterase: x / RBC: x / WBC x   Sq Epi: x / Non Sq Epi: x / Bacteria: x      CAPILLARY BLOOD GLUCOSE            Culture - Sputum (collected 08-21-24 @ 12:20)  Source: .Sputum Sputum  Gram Stain (08-21-24 @ 22:35):    Few polymorphonuclear leukocytes per low power field    No Squamous epithelial cells per low power field    Few Gram positive cocci in pairs per oil power field    Rare Gram Negative Rods per oil power field  Preliminary Report (08-22-24 @ 19:55):    Normal Respiratory Malissa present        RADIOLOGY & ADDITIONAL TESTS:    Personally reviewed.     Consultant(s) Notes Reviewed:  [x] YES  [ ] NO     Patient is a 63y old  Male who presents with a chief complaint of COVID+ with associated hypoxia (23 Aug 2024 12:49)      INTERVAL HPI/OVERNIGHT EVENTS: Patient seen and examined at bedside. No overnight events occurred. Patient has no complaints at this time. Denies fevers, chills, headache, lightheadedness, chest pain, dyspnea, abdominal pain, n/v/d/c.    MEDICATIONS  (STANDING):  aspirin  chewable 81 milliGRAM(s) Oral daily  benzocaine/menthol Lozenge 1 Lozenge Oral three times a day  benzonatate 100 milliGRAM(s) Oral three times a day  dexAMETHasone     Tablet 6 milliGRAM(s) Oral daily  enoxaparin Injectable 40 milliGRAM(s) SubCutaneous every 24 hours  ezetimibe 5 milliGRAM(s) Oral daily  famotidine    Tablet 20 milliGRAM(s) Oral daily  guaiFENesin  milliGRAM(s) Oral every 12 hours  rosuvastatin 20 milliGRAM(s) Oral at bedtime  senna 2 Tablet(s) Oral at bedtime    MEDICATIONS  (PRN):  acetaminophen     Tablet .. 650 milliGRAM(s) Oral every 6 hours PRN Temp greater or equal to 38C (100.4F), Mild Pain (1 - 3)  melatonin 3 milliGRAM(s) Oral at bedtime PRN Insomnia  polyethylene glycol 3350 17 Gram(s) Oral daily PRN Constipation      Allergies    No Known Allergies    Intolerances        REVIEW OF SYSTEMS:  CONSTITUTIONAL: No fever or chills  HEENT:  No headache, no sore throat  RESPIRATORY: No cough, wheezing, or shortness of breath  CARDIOVASCULAR: No chest pain, palpitations  GASTROINTESTINAL: No abd pain, nausea, vomiting, or diarrhea  GENITOURINARY: No dysuria, frequency, or hematuria  NEUROLOGICAL: no focal weakness or dizziness  MUSCULOSKELETAL: no myalgias     Vital Signs Last 24 Hrs  T(C): 36.4 (24 Aug 2024 11:17), Max: 36.4 (23 Aug 2024 13:00)  T(F): 97.6 (24 Aug 2024 11:17), Max: 97.6 (23 Aug 2024 20:28)  HR: 69 (24 Aug 2024 11:17) (55 - 69)  BP: 120/73 (24 Aug 2024 11:17) (110/61 - 132/75)  BP(mean): --  RR: 17 (24 Aug 2024 11:17) (16 - 20)  SpO2: 93% (24 Aug 2024 11:17) (89% - 93%)    Parameters below as of 24 Aug 2024 11:17  Patient On (Oxygen Delivery Method): room air        PHYSICAL EXAM:  GENERAL: NAD  HEENT:  anicteric, moist mucous membranes  CHEST/LUNG:  increase air entry, no rales, wheezes, or rhonchi  HEART:  RRR, S1, S2  ABDOMEN:  BS+, soft, nontender, nondistended  EXTREMITIES: no edema, cyanosis, or calf tenderness  NERVOUS SYSTEM: answers questions and follows commands appropriately    LABS:                        16.2   14.55 )-----------( 223      ( 24 Aug 2024 09:55 )             47.3     CBC Full  -  ( 24 Aug 2024 09:55 )  WBC Count : 14.55 K/uL  Hemoglobin : 16.2 g/dL  Hematocrit : 47.3 %  Platelet Count - Automated : 223 K/uL  Mean Cell Volume : 89.4 fl  Mean Cell Hemoglobin : 30.6 pg  Mean Cell Hemoglobin Concentration : 34.2 gm/dL  Auto Neutrophil # : x  Auto Lymphocyte # : x  Auto Monocyte # : x  Auto Eosinophil # : x  Auto Basophil # : x  Auto Neutrophil % : x  Auto Lymphocyte % : x  Auto Monocyte % : x  Auto Eosinophil % : x  Auto Basophil % : x    24 Aug 2024 09:55    139    |  101    |  25     ----------------------------<  231    4.1     |  31     |  1.00     Ca    9.0        24 Aug 2024 09:55  Phos  3.4       24 Aug 2024 09:55  Mg     2.5       24 Aug 2024 09:55    TPro  6.5    /  Alb  2.7    /  TBili  0.5    /  DBili  0.1    /  AST  49     /  ALT  94     /  AlkPhos  58     24 Aug 2024 09:55    PT/INR - ( 24 Aug 2024 09:55 )   PT: 14.6 sec;   INR: 1.29 ratio           Urinalysis Basic - ( 24 Aug 2024 09:55 )    Color: x / Appearance: x / SG: x / pH: x  Gluc: 231 mg/dL / Ketone: x  / Bili: x / Urobili: x   Blood: x / Protein: x / Nitrite: x   Leuk Esterase: x / RBC: x / WBC x   Sq Epi: x / Non Sq Epi: x / Bacteria: x      CAPILLARY BLOOD GLUCOSE            Culture - Sputum (collected 08-21-24 @ 12:20)  Source: .Sputum Sputum  Gram Stain (08-21-24 @ 22:35):    Few polymorphonuclear leukocytes per low power field    No Squamous epithelial cells per low power field    Few Gram positive cocci in pairs per oil power field    Rare Gram Negative Rods per oil power field  Preliminary Report (08-22-24 @ 19:55):    Normal Respiratory Malissa present        RADIOLOGY & ADDITIONAL TESTS:    Personally reviewed.     Consultant(s) Notes Reviewed:  [x] YES  [ ] NO

## 2024-08-24 NOTE — PROGRESS NOTE ADULT - ASSESSMENT
63 year old male with PMhx of CAD s/p CABG, HLD presents with cough, chills, fever, COVID+ 3 days ago and started on paxlovid. Endorses productive cough, weakness, fevers, chills. Denies CP, abdominal pain, n/v/d.    cad  cabg   hld  covid  weakness  malaise    ct neg for pe - small eff - atelectasis  dilated aortic root  ID eval noted  check RA Sat    remdesivir -   decadron -   cough rx regimen  dvt p  isol precs  o2 support  goal sat > 88 pct  acap prn  sx management  cvs rx regimen   Patient, pt is a DDS
63 year old male with PMHx of CAD s/p CABG, HLD presents with cough, fever, chills, COVID+ 3 days ago started on paxlovid, admitted for COVID+ with associated hypoxia.

## 2024-08-24 NOTE — CASE MANAGEMENT PROGRESS NOTE - NSCMPROGRESSNOTE_GEN_ALL_CORE
Discussed pt w Dr Holm, pt is not medically ready for transition home. CM will continue to collaborate with interdisciplinary team and remain available to assist.

## 2024-08-24 NOTE — PROGRESS NOTE ADULT - ATTENDING COMMENTS
Pt. seen and evaluated for acute hypoxic respiratory failure 2/2 COVID 19 infection.  Pt. is in no distress.  Denies feeling SOB.  On room air with SPO2 in 90th%.  Physical examination as above.  Pt. has completed course of Remdesivir.  Will continue Decadron and monitoring of SPO2.
Pt. seen and evaluated for acute hypoxic respiratory failure 2/2 COVID infection.  Pt. is in no distress.  However, is very anxious in regards to his fluctuating SpO2.  Physical examination as above.  Will continue Remdesivir and decadron.  Does not meet criteria for home O2.  ID and Pulmonary f/u.
Pt. seen and evaluated for acute hypoxic respiratory failure 2/2 COVID infection.  Pt. is in no distress.  Tolerating Remdesivir and decadron.  On 1L nasal canula with SpO2 in low 90th%. Physical examination as above.  Will continue Remdesivir and decadron.  Check room air SpO2 and need for home O2.
Pt. seen and evaluated for acute hypoxic respiratory failure 2/2 COVID 19 infection.  Pt. is in no distress.  On 3L NC with SpO2 in low 90th%.  Reports some improvement with SOB, but having intermittent productive cough.  Physical examination as above.  Will continue Remdesivir and decadron.  Attempt to titrate down supplemental O2 as tolerable to keep SpO2>90%.
63 year old male with PMHx of CAD s/p CABG, HLD presents with cough, fever, chills, COVID+ 3 days ago started on paxlovid, admitted for COVID+ with associated hypoxia. Continue Remdesivir. CTA neg for PE. PT/OT. Treatment will be dependent on clinical course. Patient and wife updated at bedside.

## 2024-08-24 NOTE — PROGRESS NOTE ADULT - PROBLEM SELECTOR PLAN 1
- Pt COVID+ 3 days ago, started on paxlovid, presenting with fevers, chills, cough, hypoxia  - Remdesivir x 5 days  - Guaifenesin 600mg BID  - Dexamethasone 6 mg x 10 days  - continue tessalon perles  - Not on home O2; titrated down supplemental O2, satting well on RA today  - CTA chest: No PE, small bilateral pleural effusions with partial atelectasis lower lobes.  - ID and Pulmonary f/u  - Leukocytosis this AM, likely reactive, will keep monitoring daily CBCs  - Home O2 eval completed; does not meet requirements for home O2 - Pt COVID+ 3 days ago, started on paxlovid, presenting with fevers, chills, cough, hypoxia  - s/p Remdesivir x 5 days  - Guaifenesin 600mg BID  - Dexamethasone 6 mg x 10 days  - continue tessalon perles  - Not on home O2; titrated down supplemental O2, satting well on RA today  - CTA chest: No PE, small bilateral pleural effusions with partial atelectasis lower lobes.  - ID and Pulmonary f/u  - Leukocytosis this AM, likely reactive and 2/2 steroids, will keep monitoring daily CBCs  - Home O2 eval completed; does not meet requirements for home O2

## 2024-08-24 NOTE — PROGRESS NOTE ADULT - PROVIDER SPECIALTY LIST ADULT
Hospitalist
Infectious Disease
Infectious Disease
Pulmonology
Hospitalist
Infectious Disease
Hospitalist

## 2024-08-25 VITALS
RESPIRATION RATE: 19 BRPM | SYSTOLIC BLOOD PRESSURE: 116 MMHG | DIASTOLIC BLOOD PRESSURE: 72 MMHG | TEMPERATURE: 98 F | OXYGEN SATURATION: 95 % | HEART RATE: 62 BPM

## 2024-08-25 PROCEDURE — 99239 HOSP IP/OBS DSCHRG MGMT >30: CPT

## 2024-08-25 RX ORDER — DEXAMETHASONE 0.75 MG
1 TABLET ORAL
Qty: 5 | Refills: 0
Start: 2024-08-25 | End: 2024-08-29

## 2024-08-25 RX ADMIN — Medication 100 MILLIGRAM(S): at 05:24

## 2024-08-25 RX ADMIN — FAMOTIDINE 20 MILLIGRAM(S): 10 INJECTION INTRAVENOUS at 11:57

## 2024-08-25 RX ADMIN — Medication 81 MILLIGRAM(S): at 11:57

## 2024-08-25 RX ADMIN — GUAIFENESIN 600 MILLIGRAM(S): 100 LIQUID ORAL at 05:24

## 2024-08-25 RX ADMIN — Medication 6 MILLIGRAM(S): at 05:23

## 2024-08-25 RX ADMIN — EZETIMIBE 5 MILLIGRAM(S): 10 TABLET ORAL at 11:57

## 2024-08-25 NOTE — DISCHARGE NOTE NURSING/CASE MANAGEMENT/SOCIAL WORK - NSDCPEFALRISK_GEN_ALL_CORE
For information on Fall & Injury Prevention, visit: [email or web address] OR  [email or web address] OR  https://www.cdc.gov/steadi/patient.html

## 2024-08-25 NOTE — CASE MANAGEMENT PROGRESS NOTE - NSCMPROGRESSNOTE_GEN_ALL_CORE
Per MD pt is stable for transition home today. O2 sat 92% on room air. Pt stated that he is feeling better and is in agreement with transitioning home today. Pt requested written information about Covid, CM notified RN who stated that he will provide patient with information requested.  CM offered home care visiting nurse services, pt declined . Discharge Notice reviewed, copy given to patient. Patient is in agreement with transitioning home today, spouse to transport him home.  Per MD pt is stable for transition home today. O2 sat 92% on room air. Pt stated that he is feeling better and is in agreement with transitioning home today. CM educated patient on Covid isolation. Pt requested written information about Covid, CM notified RN who stated that he will provide patient with information requested.  CM offered home care visiting nurse services, pt declined . Discharge Notice reviewed, copy given to patient. Patient is in agreement with transitioning home today, spouse to transport him home.

## 2024-08-25 NOTE — DISCHARGE NOTE NURSING/CASE MANAGEMENT/SOCIAL WORK - PATIENT PORTAL LINK FT
You can access the FollowMyHealth Patient Portal offered by [facility] by registering at the following website: [email or web address]. By joining [facility]’s FollowMyHealth portal, you will also be able to view your health information using other applications (apps) compatible with our system.

## 2024-08-26 LAB
CULTURE RESULTS: ABNORMAL
SPECIMEN SOURCE: SIGNIFICANT CHANGE UP

## 2024-09-10 ENCOUNTER — OFFICE (OUTPATIENT)
Dept: URBAN - METROPOLITAN AREA CLINIC 109 | Facility: CLINIC | Age: 64
Setting detail: OPHTHALMOLOGY
End: 2024-09-10
Payer: MEDICARE

## 2024-09-10 DIAGNOSIS — H15.111: ICD-10-CM

## 2024-09-10 PROCEDURE — 92004 COMPRE OPH EXAM NEW PT 1/>: CPT | Performed by: OPHTHALMOLOGY

## 2024-09-10 ASSESSMENT — CONFRONTATIONAL VISUAL FIELD TEST (CVF)
OD_FINDINGS: FULL
OS_FINDINGS: FULL

## 2024-09-12 ENCOUNTER — RX ONLY (RX ONLY)
Age: 64
End: 2024-09-12

## 2024-09-12 ENCOUNTER — OFFICE (OUTPATIENT)
Dept: URBAN - METROPOLITAN AREA CLINIC 109 | Facility: CLINIC | Age: 64
Setting detail: OPHTHALMOLOGY
End: 2024-09-12
Payer: MEDICARE

## 2024-09-12 DIAGNOSIS — H15.111: ICD-10-CM

## 2024-09-12 PROCEDURE — 99213 OFFICE O/P EST LOW 20 MIN: CPT | Performed by: OPHTHALMOLOGY

## 2024-09-12 ASSESSMENT — CONFRONTATIONAL VISUAL FIELD TEST (CVF)
OD_FINDINGS: FULL
OS_FINDINGS: FULL

## 2024-09-22 ENCOUNTER — NON-APPOINTMENT (OUTPATIENT)
Age: 64
End: 2024-09-22

## 2024-09-29 PROCEDURE — 80076 HEPATIC FUNCTION PANEL: CPT

## 2024-09-29 PROCEDURE — 83735 ASSAY OF MAGNESIUM: CPT

## 2024-09-29 PROCEDURE — 80053 COMPREHEN METABOLIC PANEL: CPT

## 2024-09-29 PROCEDURE — 87070 CULTURE OTHR SPECIMN AEROBIC: CPT

## 2024-09-29 PROCEDURE — 85027 COMPLETE CBC AUTOMATED: CPT

## 2024-09-29 PROCEDURE — 36415 COLL VENOUS BLD VENIPUNCTURE: CPT

## 2024-09-29 PROCEDURE — 71275 CT ANGIOGRAPHY CHEST: CPT | Mod: MC

## 2024-09-29 PROCEDURE — 85025 COMPLETE CBC W/AUTO DIFF WBC: CPT

## 2024-09-29 PROCEDURE — 71045 X-RAY EXAM CHEST 1 VIEW: CPT

## 2024-09-29 PROCEDURE — 84145 PROCALCITONIN (PCT): CPT

## 2024-09-29 PROCEDURE — 80061 LIPID PANEL: CPT

## 2024-09-29 PROCEDURE — 83036 HEMOGLOBIN GLYCOSYLATED A1C: CPT

## 2024-09-29 PROCEDURE — 97161 PT EVAL LOW COMPLEX 20 MIN: CPT

## 2024-09-29 PROCEDURE — 82565 ASSAY OF CREATININE: CPT

## 2024-09-29 PROCEDURE — 80048 BASIC METABOLIC PNL TOTAL CA: CPT

## 2024-09-29 PROCEDURE — 85610 PROTHROMBIN TIME: CPT

## 2024-09-29 PROCEDURE — 85379 FIBRIN DEGRADATION QUANT: CPT

## 2024-09-29 PROCEDURE — 82248 BILIRUBIN DIRECT: CPT

## 2024-09-29 PROCEDURE — 99285 EMERGENCY DEPT VISIT HI MDM: CPT | Mod: 25

## 2024-09-29 PROCEDURE — 84100 ASSAY OF PHOSPHORUS: CPT

## 2024-09-29 PROCEDURE — 87205 SMEAR GRAM STAIN: CPT

## 2024-09-29 PROCEDURE — 87635 SARS-COV-2 COVID-19 AMP PRB: CPT

## 2024-10-01 ENCOUNTER — OFFICE (OUTPATIENT)
Dept: URBAN - METROPOLITAN AREA CLINIC 109 | Facility: CLINIC | Age: 64
Setting detail: OPHTHALMOLOGY
End: 2024-10-01
Payer: MEDICARE

## 2024-10-01 DIAGNOSIS — H15.111: ICD-10-CM

## 2024-10-01 PROCEDURE — 92012 INTRM OPH EXAM EST PATIENT: CPT | Performed by: OPHTHALMOLOGY

## 2024-10-01 ASSESSMENT — REFRACTION_AUTOREFRACTION
OS_SPHERE: -2.50
OD_CYLINDER: -0.25
OS_AXIS: 049
OD_AXIS: 077
OS_CYLINDER: -0.25
OD_SPHERE: -2.25

## 2024-10-01 ASSESSMENT — CONFRONTATIONAL VISUAL FIELD TEST (CVF)
OD_FINDINGS: FULL
OS_FINDINGS: FULL

## 2024-10-01 ASSESSMENT — TONOMETRY
OD_IOP_MMHG: 20
OS_IOP_MMHG: 16

## 2024-10-01 ASSESSMENT — REFRACTION_CURRENTRX
OD_SPHERE: -2.75
OS_SPHERE: -2.75
OS_CYLINDER: -0.50
OD_OVR_VA: 20/
OS_AXIS: 17
OS_OVR_VA: 20/

## 2024-10-01 ASSESSMENT — KERATOMETRY
OS_K1POWER_DIOPTERS: 44.25
OS_AXISANGLE_DEGREES: 128
OD_K2POWER_DIOPTERS: 44.25
OD_K1POWER_DIOPTERS: 44.25
OS_K2POWER_DIOPTERS: 44.50
OD_AXISANGLE_DEGREES: 090

## 2024-10-01 ASSESSMENT — VISUAL ACUITY
OS_BCVA: 20/25-2
OD_BCVA: 20/20

## 2025-05-27 NOTE — PATIENT PROFILE ADULT - FUNCTIONAL ASSESSMENT - BASIC MOBILITY 3.
Kandi Kim  [address] [address]    Dear Kandi Kim,      I am a team member within the [facility] with [facility]. I recently tried to reach you to ensure you were doing well following a recent visit within our health system. I also wanted to take this chance to introduce Clinic Care Coordination.     Below is a description of Clinic Care Coordination and how this team can further assist you:       The Clinic Care Coordination team is made up of a Registered Nurse, , Financial Resource Worker, and a Community Health Worker who understand and can help navigate the health care system. The goal of clinic care coordination is to help you manage your health, improve access to care, and achieve optimal health outcomes. They work alongside your provider to assist you in determining your health and social needs, obtain health care and community resources, and provide you with necessary information and education. Clinic Care Coordination can work with you through any barriers and develop a care plan that helps coordinate and strengthen the relationship between you and your care team.    If you wish to connect with the Clinic Care Coordination Team, please [facility] or Clinic Care Team know and they can place a referral. The Clinic Care Coordination team will then reach out by phone to further support you.    We are focused on providing you with the highest-quality healthcare experience possible.    Sincerely,   Your care team with [facility]     Detail Level: Zone Render In Strict Bullet Format?: No Continue Regimen: Triamcinolone 0.1% cream BID prn \\nOTC antihistamines fair balance 4 = No assist / stand by assistance